# Patient Record
Sex: FEMALE | Race: ASIAN | URBAN - METROPOLITAN AREA
[De-identification: names, ages, dates, MRNs, and addresses within clinical notes are randomized per-mention and may not be internally consistent; named-entity substitution may affect disease eponyms.]

---

## 2018-11-05 ENCOUNTER — HISTORICAL (OUTPATIENT)
Dept: ADMINISTRATIVE | Facility: HOSPITAL | Age: 78
End: 2018-11-05

## 2018-11-05 LAB
HCV AB SERPL QL IA: NEGATIVE
TSH SERPL-ACNC: 2.31 MIU/L (ref 0.36–3.74)

## 2018-11-16 ENCOUNTER — HISTORICAL (OUTPATIENT)
Dept: LAB | Facility: HOSPITAL | Age: 78
End: 2018-11-16

## 2022-04-11 ENCOUNTER — HISTORICAL (OUTPATIENT)
Dept: ADMINISTRATIVE | Facility: HOSPITAL | Age: 82
End: 2022-04-11

## 2022-04-25 VITALS
DIASTOLIC BLOOD PRESSURE: 76 MMHG | HEIGHT: 59 IN | WEIGHT: 106.69 LBS | SYSTOLIC BLOOD PRESSURE: 164 MMHG | BODY MASS INDEX: 21.51 KG/M2 | OXYGEN SATURATION: 100 %

## 2024-03-12 ENCOUNTER — TELEPHONE (OUTPATIENT)
Dept: INTERNAL MEDICINE | Facility: CLINIC | Age: 84
End: 2024-03-12
Payer: MEDICARE

## 2024-03-12 NOTE — TELEPHONE ENCOUNTER
I never worked in [place] and I do not see an upcoming appointment with me. Not sure what this is about.

## 2024-03-12 NOTE — TELEPHONE ENCOUNTER
----- Message from Rhonda Ward sent at 3/11/2024 10:35 AM CDT -----  Contact: Kathya [phone]  Would like to receive medical advice.      Would they like a call back or a response via [facility]:  call back    Additional information:  Kathya is calling to speak to the provider or staff. Pt states she used to see the provider in [place] and would like to become an EP with the provider. Please call the pt back for advice.

## 2024-03-12 NOTE — TELEPHONE ENCOUNTER
----- Message from Amy Cano sent at 3/12/2024  9:31 AM CDT -----  Contact: [phone]  1MEDICALADVICE     Patient is calling for Medical Advice regarding:needs fax number for the drs office     How long has patient had these symptoms:    Pharmacy name and phone#:    Would like response via [employer]:  no     Comments:  She is coming in as a new pt and is needing to have the fax number for the office so she can get the all her records from [place]

## 2024-03-21 ENCOUNTER — OFFICE VISIT (OUTPATIENT)
Dept: INTERNAL MEDICINE | Facility: CLINIC | Age: 84
End: 2024-03-21
Payer: MEDICARE

## 2024-03-21 ENCOUNTER — LAB VISIT (OUTPATIENT)
Dept: LAB | Facility: HOSPITAL | Age: 84
End: 2024-03-21
Payer: MEDICARE

## 2024-03-21 VITALS
BODY MASS INDEX: 19.69 KG/M2 | SYSTOLIC BLOOD PRESSURE: 152 MMHG | WEIGHT: 97.69 LBS | HEIGHT: 59 IN | HEART RATE: 52 BPM | OXYGEN SATURATION: 100 % | DIASTOLIC BLOOD PRESSURE: 81 MMHG | RESPIRATION RATE: 18 BRPM | TEMPERATURE: 98 F

## 2024-03-21 DIAGNOSIS — R79.9 ABNORMAL FINDING OF BLOOD CHEMISTRY, UNSPECIFIED: ICD-10-CM

## 2024-03-21 DIAGNOSIS — Z76.89 ENCOUNTER TO ESTABLISH CARE WITH NEW DOCTOR: ICD-10-CM

## 2024-03-21 DIAGNOSIS — E78.5 HYPERLIPIDEMIA, UNSPECIFIED HYPERLIPIDEMIA TYPE: Primary | ICD-10-CM

## 2024-03-21 DIAGNOSIS — E78.5 HYPERLIPIDEMIA, UNSPECIFIED HYPERLIPIDEMIA TYPE: ICD-10-CM

## 2024-03-21 DIAGNOSIS — I10 ESSENTIAL HYPERTENSION: ICD-10-CM

## 2024-03-21 DIAGNOSIS — M54.16 LUMBAR RADICULOPATHY: ICD-10-CM

## 2024-03-21 DIAGNOSIS — R00.1 ASYMPTOMATIC BRADYCARDIA: ICD-10-CM

## 2024-03-21 DIAGNOSIS — Z12.31 ENCOUNTER FOR SCREENING MAMMOGRAM FOR MALIGNANT NEOPLASM OF BREAST: ICD-10-CM

## 2024-03-21 DIAGNOSIS — M81.0 OSTEOPOROSIS, UNSPECIFIED OSTEOPOROSIS TYPE, UNSPECIFIED PATHOLOGICAL FRACTURE PRESENCE: ICD-10-CM

## 2024-03-21 PROBLEM — I72.9 RUPTURED ANEURYSM OF ARTERY: Status: ACTIVE | Noted: 2022-05-18

## 2024-03-21 PROBLEM — I49.1 PAC (PREMATURE ATRIAL CONTRACTION): Status: ACTIVE | Noted: 2022-02-22

## 2024-03-21 PROBLEM — Z86.79 HISTORY OF CEREBRAL ANEURYSM: Status: ACTIVE | Noted: 2024-03-21

## 2024-03-21 PROBLEM — Z86.79 HISTORY OF CEREBRAL ANEURYSM: Status: RESOLVED | Noted: 2024-03-21 | Resolved: 2024-03-21

## 2024-03-21 PROBLEM — I65.29 CAROTID ARTERY STENOSIS: Status: ACTIVE | Noted: 2024-03-21

## 2024-03-21 PROBLEM — I35.1 MILD AORTIC INSUFFICIENCY: Status: ACTIVE | Noted: 2024-03-21

## 2024-03-21 PROBLEM — I34.0 MILD MITRAL INSUFFICIENCY: Status: ACTIVE | Noted: 2024-03-21

## 2024-03-21 LAB
ESTIMATED AVG GLUCOSE: 105 MG/DL (ref 68–131)
HBA1C MFR BLD: 5.3 % (ref 4–5.6)

## 2024-03-21 PROCEDURE — 36415 COLL VENOUS BLD VENIPUNCTURE: CPT

## 2024-03-21 PROCEDURE — 83036 HEMOGLOBIN GLYCOSYLATED A1C: CPT

## 2024-03-21 PROCEDURE — 99215 OFFICE O/P EST HI 40 MIN: CPT | Mod: PBBFAC

## 2024-03-21 PROCEDURE — 99213 OFFICE O/P EST LOW 20 MIN: CPT | Mod: S$PBB,GC,,

## 2024-03-21 PROCEDURE — 99999 PR PBB SHADOW E&M-EST. PATIENT-LVL V: CPT | Mod: PBBFAC,GC,,

## 2024-03-21 RX ORDER — LOSARTAN POTASSIUM AND HYDROCHLOROTHIAZIDE 12.5; 5 MG/1; MG/1
1 TABLET ORAL DAILY
COMMUNITY
Start: 2023-04-19

## 2024-03-21 RX ORDER — SIMVASTATIN 5 MG/1
5 TABLET, FILM COATED ORAL NIGHTLY
COMMUNITY

## 2024-03-21 RX ORDER — CALCIUM CARBONATE 500(1250)
1 TABLET,CHEWABLE ORAL DAILY
COMMUNITY

## 2024-03-21 RX ORDER — LANOLIN ALCOHOL/MO/W.PET/CERES
500 CREAM (GRAM) TOPICAL
COMMUNITY

## 2024-03-21 RX ORDER — CELECOXIB 200 MG/1
200 CAPSULE ORAL DAILY PRN
Qty: 90 CAPSULE | Refills: 0 | Status: SHIPPED | OUTPATIENT
Start: 2024-03-21 | End: 2024-06-19

## 2024-03-21 RX ORDER — GABAPENTIN 100 MG/1
200 CAPSULE ORAL 3 TIMES DAILY
Qty: 180 CAPSULE | Refills: 11 | Status: SHIPPED | OUTPATIENT
Start: 2024-03-21 | End: 2025-03-21

## 2024-03-21 RX ORDER — FLECAINIDE ACETATE 50 MG/1
50 TABLET ORAL DAILY PRN
COMMUNITY
Start: 2024-01-04 | End: 2024-04-03

## 2024-03-21 RX ORDER — LANOLIN ALCOHOL/MO/W.PET/CERES
1000 CREAM (GRAM) TOPICAL
COMMUNITY

## 2024-03-21 RX ORDER — CELECOXIB 200 MG/1
200 CAPSULE ORAL DAILY PRN
COMMUNITY
End: 2024-03-21 | Stop reason: SDUPTHER

## 2024-03-21 RX ORDER — ACETAMINOPHEN 500 MG
2000 TABLET ORAL DAILY
COMMUNITY

## 2024-03-21 NOTE — PROGRESS NOTES
Subjective     Chief Complaint: Establish Care    History of Present Illness:  Ms. Kathya Romero is a 83 y.o. female with hx of HTN, stroke, HLD, lumbar radiculopathy, osteoporosis who presented to the clinic to establish care after recently moving here from [place]. She is here for a routine follow up. Her main concerns today is chronic low back pain secondary to lumbar radiculopathy. She reports having left sided lower back pain with radiation down her left leg and is associated with tingling. She is currently taking Gabapentin and Celebrex for which helps her pain. She also report chronic intermittent shortness of breath secondary to mitral and aortic valve insufficieny. Her shortness of breath as been stable and was previously followed by Cardiology at [facility]. She has plans to establish care with Cardiology today. Denies any chest pain, current palpitations, abd pain, nausea, vomiting, diarrhea, weakness, numbness, headaches or any vision changes.    HTN: Currently controlled on Losartan-HCTZ  Heart Palpitations: Reports a hx of heart palpitations that she takes Flecainide for. Per chart review at [facility] cardiology, Holter monitor showed Sinus tachycardia but could not rule out Atrial flutter and EKGs have shown PACs.  Hyperlipidemia: Currently on Simvastatin and Niacin  Carotid Artery Stenosis: Reported history of Carotid Artery Stenosis.   Osteoporosis: Currently on Vitamin D3 and Calcium. Previously on ibandronate. DEXA scan from 2021 showed osteoporosis.    Health Maintenance:  Reports that she has completed the series of COVID, Shingles and Pneumoccal vaccine. Would like to get RSV vaccine today. Request to get a mammogram and DEXA scan done.    Review of Systems   Constitutional:  Negative for chills and fever.   HENT:  Negative for congestion.    Eyes:  Negative for blurred vision.   Respiratory:  Positive for shortness of breath. Negative for cough and wheezing.    Cardiovascular:  Positive for  palpitations. Negative for chest pain.   Gastrointestinal:  Negative for abdominal pain, constipation, diarrhea and vomiting.   Musculoskeletal:  Positive for back pain.   Neurological:  Negative for dizziness and headaches.       PAST HISTORY:     Past Medical History:   Diagnosis Date    Cerebral aneurysm     Hemorrhagic stroke     Supraventricular tachycardia        Past Surgical History:   Procedure Laterality Date    CEREBRAL ANEURYSM REPAIR      GALLBLADDER SURGERY  2006    HEMORRHOID SURGERY  1975    HYSTERECTOMY      TONSILLECTOMY         Family History   Problem Relation Age of Onset    Heart disease Mother     Pancreatic cancer Mother     Heart disease Father        Social History     Socioeconomic History    Marital status:    Tobacco Use    Smoking status: Never    Smokeless tobacco: Never   Substance and Sexual Activity    Alcohol use: Never    Drug use: Never       MEDICATIONS & ALLERGIES:     Current Outpatient Medications on File Prior to Visit   Medication Sig    calcium carbonate (OS-TAMMI) 500 mg calcium (1,250 mg) chewable tablet Take 1 tablet by mouth once daily.    cholecalciferol, vitamin D3, (VITAMIN D3) 50 mcg (2,000 unit) Cap capsule Take 2,000 Units by mouth once daily.    cyanocobalamin (VITAMIN B-12) 1000 MCG tablet Take 1,000 mcg by mouth.    flecainide (TAMBOCOR) 50 MG Tab Take 50 mg by mouth daily as needed (palpitations).    losartan-hydrochlorothiazide 50-12.5 mg (HYZAAR) 50-12.5 mg per tablet Take 1 tablet by mouth once daily.    niacin 500 mg TbSR Take 500 mg by mouth.    simvastatin (ZOCOR) 5 MG tablet Take 5 mg by mouth every evening.    [DISCONTINUED] celecoxib (CELEBREX) 200 MG capsule Take 200 mg by mouth daily as needed.     No current facility-administered medications on file prior to visit.       Review of patient's allergies indicates:   Allergen Reactions    Aspirin     Nitrofurantoin Nausea And Vomiting       OBJECTIVE:     Vital Signs:  Vitals:    03/21/24 0820  "03/21/24 0900   BP: (!) 177/71 (!) 152/81   Pulse: (!) 52    Resp: 18    Temp: 98.1 °F (36.7 °C)    SpO2: 100%    Weight: 44.3 kg (97 lb 10.6 oz)    Height: 4' 11" (1.499 m)        Body mass index is 19.73 kg/m².     Physical Exam  Vitals and nursing note reviewed.   Constitutional:       General: She is not in acute distress.     Appearance: Normal appearance.   HENT:      Head: Atraumatic.      Right Ear: External ear normal.      Left Ear: External ear normal.      Mouth/Throat:      Mouth: Mucous membranes are moist.      Pharynx: Oropharynx is clear.   Eyes:      Extraocular Movements: Extraocular movements intact.   Neck:      Vascular: No carotid bruit.   Cardiovascular:      Rate and Rhythm: Bradycardia present. Rhythm irregularly irregular.      Pulses:           Radial pulses are 2+ on the right side and 2+ on the left side.      Heart sounds: Normal heart sounds.   Pulmonary:      Effort: Pulmonary effort is normal.      Breath sounds: Normal breath sounds and air entry.   Abdominal:      General: Abdomen is flat. Bowel sounds are normal.      Palpations: Abdomen is soft.   Musculoskeletal:      Right lower leg: No edema.      Left lower leg: No edema.   Skin:     General: Skin is warm and dry.      Capillary Refill: Capillary refill takes less than 2 seconds.   Neurological:      General: No focal deficit present.      Mental Status: She is alert and oriented to person, place, and time.   Psychiatric:         Mood and Affect: Mood normal.         Behavior: Behavior normal.         Laboratory  No results found for this or any previous visit (from the past 24 hour(s)).    Diagnostic Results:      Health Maintenance Due   Topic Date Due    Lipid Panel  Never done    COVID-19 Vaccine (1) Never done    DEXA Scan  Never done    Shingles Vaccine (1 of 2) Never done    RSV Vaccine (Age 60+ and Pregnant patients) (1 - 1-dose 60+ series) Never done    Pneumococcal Vaccines (Age 65+) (2 of 2 - PPSV23 or PCV20) " 04/01/2017         ASSESSMENT & PLAN:   83 y.o. female with hx of HTN, stroke, HLD, lumbar radiculopathy, osteoporosis who presented to the clinic to establish care after recently moving here from [place]. Discussed with patient about her lumbar radiculopathy which is stable. Refill for Celebrex sent and will increase Gabapentin to 200 mg TID. Will refer patient to [facility]. For shortness of breath, appears to be secondary to aortic and mitral insufficiency. Patient plans to establish care with cardiology for palpitations and valve insufficiency. Will continue Calcium and Vitamin D3 for osteoporosis and will refer for DEXA scan. Low threshold to restart ibandronate pending DEXA results. Will order A1c, CBC, CMP, lipid panel today. Patient agreeable to get RSV vaccine today. Will refer to screening mammogram today. Patient elevated BP in clinic likely secondary to white coat hypertension. Patient's bradycardia likely physiologic in nature as patient's resting heart rate appears to run in the 50s after reviewing Apple Watch data. Will continue to monitor.    1. Hyperlipidemia, unspecified hyperlipidemia type  -     CBC W/ AUTO DIFFERENTIAL; Future; Expected date: 03/21/2024  -     COMPREHENSIVE METABOLIC PANEL; Future; Expected date: 03/21/2024  -     LIPID PANEL; Future; Expected date: 03/21/2024  -     HEMOGLOBIN A1C; Future; Expected date: 03/21/2024    2. Essential hypertension    3. Encounter to establish care with new doctor  -     RSV, Bivalent, RSVPREF (ABRYSVO)  -     Mammo Digital Screening Bilat w/ Bertrand; Future; Expected date: 03/21/2024  -     HEMOGLOBIN A1C; Future; Expected date: 03/21/2024    4. Lumbar radiculopathy  -     Ambulatory referral/consult to [facility]; Future; Expected date: 03/28/2024    5. Osteoporosis, unspecified osteoporosis type, unspecified pathological fracture presence  -     DXA Bone Density Axial Skeleton 1 or more sites; Future; Expected date:  03/21/2024    6. Encounter for screening mammogram for malignant neoplasm of breast  -     Mammo Digital Screening Bilat w/ Bertrand; Future; Expected date: 03/21/2024    7. Abnormal finding of blood chemistry, unspecified  -     HEMOGLOBIN A1C; Future; Expected date: 03/21/2024    8. Asymptomatic bradycardia    Other orders  -     gabapentin (NEURONTIN) 100 MG capsule; Take 2 capsules (200 mg total) by mouth 3 (three) times daily.  Dispense: 180 capsule; Refill: 11  -     celecoxib (CELEBREX) 200 MG capsule; Take 1 capsule (200 mg total) by mouth daily as needed for Pain.  Dispense: 90 capsule; Refill: 0        See above for further detail.    RTC in 6 months    Discussed with Dr Mccoy -- staff attestation to follow      Lorene Beauchamp DO  Internal Medicine PGY-1  [facility]

## 2024-03-22 ENCOUNTER — TELEPHONE (OUTPATIENT)
Dept: INTERNAL MEDICINE | Facility: CLINIC | Age: 84
End: 2024-03-22
Payer: MEDICARE

## 2024-03-22 NOTE — TELEPHONE ENCOUNTER
----- Message from Florecita Gonzalez sent at 3/22/2024  2:32 PM CDT -----  Contact: [phone] patient  Requesting an RX refill or new RX.  Is this a refill or new RX:   RX name and strength Fluticasone Propionate nasal spray 50 mcg  Is this a 30 day or 90 day RX: 90  Pharmacy name and phone # [facility] - [place], [place] - [facility]      The doctors have asked that we provide their patients with the following 2 reminders -- prescription refills can take up to 72 hours, and a friendly reminder that in the future you can use your [facility] account to request refills: yes

## 2024-03-22 NOTE — TELEPHONE ENCOUNTER
----- Message from Florecita Gonzalez sent at 3/22/2024  2:30 PM CDT -----  Contact: Patient [phone]  1MEDICALADVICE     Patient is calling for Medical Advice regarding:Patient want to know if she can take the gabapentin (NEURONTIN) 100 MG capsule for his back pain and leg pain. If so how should she take     How long has patient had these symptoms:    Pharmacy name and phone#:    Would like response via [employer]:  call     Comments:

## 2024-03-22 NOTE — TELEPHONE ENCOUNTER
----- Message from Florecita Gonzalez sent at 3/22/2024  2:30 PM CDT -----  Contact: Patient [phone]  1MEDICALADVICE     Patient is calling for Medical Advice regarding:Patient want to know if she can take the gabapentin (NEURONTIN) 100 MG capsule for his back pain and leg pain. If so how should she take     How long has patient had these symptoms:    Pharmacy name and phone#:    Would like response via [employer]:  call     Comments:

## 2024-03-23 RX ORDER — FLUTICASONE PROPIONATE 50 MCG
1 SPRAY, SUSPENSION (ML) NASAL DAILY
Qty: 18.2 ML | Refills: 0 | Status: SHIPPED | OUTPATIENT
Start: 2024-03-23 | End: 2024-03-26

## 2024-03-25 ENCOUNTER — TELEPHONE (OUTPATIENT)
Dept: INTERNAL MEDICINE | Facility: CLINIC | Age: 84
End: 2024-03-25
Payer: MEDICARE

## 2024-03-25 NOTE — TELEPHONE ENCOUNTER
----- Message from Stuart Viera sent at 3/25/2024  2:13 PM CDT -----  Contact: [phone]  1MEDICALADVICE     Patient is calling for Medical Advice regarding:fluticasone propionate (FLONASE) 50 mcg/actuation nasal spray    How long has patient had these symptoms:    Pharmacy name and phone#:    Would like response via mychart:  call back     Comments:    Pt said she needs a call back about her fluticasone propionate (FLONASE) 50 mcg/actuation nasal spray and gabapentin (NEURONTIN) 100 MG capsule

## 2024-03-26 RX ORDER — FLUTICASONE PROPIONATE 50 MCG
2 SPRAY, SUSPENSION (ML) NASAL DAILY
Qty: 16 G | Refills: 3 | Status: SHIPPED | OUTPATIENT
Start: 2024-03-26 | End: 2024-04-12 | Stop reason: SDUPTHER

## 2024-03-27 NOTE — TELEPHONE ENCOUNTER
Called patient at 1:03 PM to address her questions. Instructed patient to take Gabapentin 200 mg TID PRN for back pain and/or leg pain. Also refilled patient's fluticasone.    Lorene Beauchamp DO  Internal Medicine PGY-1  [facility]

## 2024-03-29 NOTE — PROGRESS NOTES
I have reviewed the notes, assessments, and/or procedures performed by Dr Nito Paulson, I concur with her/his documentation of Kathya Romero.  Date of Service: 3/21/2024

## 2024-03-29 NOTE — PROGRESS NOTES
I have reviewed the notes, assessments, and/or procedures performed by the resident, I concur with her/his documentation of Kathya Romero.  Date of Service: 3/21/2024

## 2024-04-12 NOTE — TELEPHONE ENCOUNTER
----- Message from Andra Yoo sent at 4/11/2024  1:17 PM CDT -----  Contact: Self/[phone]  1MEDICALADVICE     Patient is calling for Medical Advice regarding:medication       Would like response via [employer]: Would like a return call     Comments: pt stated that she is calling in regards to needing to speak with the nurse about a medication that she was prescribed and also she never received a call to schedule her test offered to schedule pt and she stated that the doctor told her someone was going to call her but have not  . Please advise

## 2024-04-17 NOTE — TELEPHONE ENCOUNTER
----- Message from Kayleigh Andujar sent at 4/17/2024  4:25 PM CDT -----  Contact: [phone]  Cc:  Lorene Beauchamp DO  Name of test:    Date of test: 03/21/24    Ordering provider: Lorene Beauchamp DO    Where was the test performed:[facility], [facility]    Comments:

## 2024-04-18 RX ORDER — FLUTICASONE PROPIONATE 50 MCG
2 SPRAY, SUSPENSION (ML) NASAL DAILY
Qty: 16 G | Refills: 3 | Status: SHIPPED | OUTPATIENT
Start: 2024-04-18 | End: 2024-06-17 | Stop reason: SDUPTHER

## 2024-04-18 NOTE — TELEPHONE ENCOUNTER
----- Message from Shabana Harris sent at 4/16/2024  8:44 AM CDT -----  Contact: pt @ [phone]  1MEDICALADVICE     Patient is calling for Medical Advice regarding: pt states she needs the Rx for 90 days and not 30 because of priciing fluticasone propionate (FLONASE) 50 mcg/actuation nasal spray    How long has patient had these symptoms:    Pharmacy name and phone#:  [facility] - [address] [address]  Phone: [phone] Fax: [phone]        Would like response via [employer]:  call     Comments:

## 2024-04-18 NOTE — TELEPHONE ENCOUNTER
----- Message from Nieves Lee sent at 4/17/2024  4:49 PM CDT -----  Contact: [phone]  Pt was seen last week and is coming in Friday to get further testing. Provider told her when she was here at her visit she could have got the RSV vaccine but she did not want to at the time. She would like to know if she can schedule a nurse visit to have it Friday in the morning or if she needs to do it through the pharmacy? Please call pt. Thanks    She states doctor gave her a pink card for her to be able to get it done with his signature on it. She is limited on transportation and wants to be able to do everything in one day while she is here.

## 2024-04-18 NOTE — TELEPHONE ENCOUNTER
----- Message from Nieves Lee sent at 4/17/2024  4:49 PM CDT -----  Contact: [phone]  Pt was seen last week and is coming in Friday to get further testing. Provider told her when she was here at her visit she could have got the RSV vaccine but she did not want to at the time. She would like to know if she can schedule a nurse visit to have it Friday in the morning or if she needs to do it through the pharmacy? Please call pt. Thanks    She states doctor gave her a pink card for her to be able to get it done with his signature on it. She is limited on transportation and wants to be able to do everything in one day while she is here.

## 2024-04-19 ENCOUNTER — HOSPITAL ENCOUNTER (OUTPATIENT)
Dept: RADIOLOGY | Facility: HOSPITAL | Age: 84
Discharge: HOME | End: 2024-04-19
Payer: MEDICARE

## 2024-04-19 ENCOUNTER — HOSPITAL ENCOUNTER (OUTPATIENT)
Dept: RADIOLOGY | Facility: CLINIC | Age: 84
Discharge: HOME | End: 2024-04-19
Payer: MEDICARE

## 2024-04-19 VITALS — WEIGHT: 96 LBS | BODY MASS INDEX: 19.39 KG/M2

## 2024-04-19 DIAGNOSIS — Z12.31 ENCOUNTER FOR SCREENING MAMMOGRAM FOR MALIGNANT NEOPLASM OF BREAST: ICD-10-CM

## 2024-04-19 DIAGNOSIS — M81.0 OSTEOPOROSIS, UNSPECIFIED OSTEOPOROSIS TYPE, UNSPECIFIED PATHOLOGICAL FRACTURE PRESENCE: ICD-10-CM

## 2024-04-19 DIAGNOSIS — Z76.89 ENCOUNTER TO ESTABLISH CARE WITH NEW DOCTOR: ICD-10-CM

## 2024-04-19 PROCEDURE — 77080 DXA BONE DENSITY AXIAL: CPT | Mod: 26,,, | Performed by: INTERNAL MEDICINE

## 2024-04-19 PROCEDURE — 77067 SCR MAMMO BI INCL CAD: CPT | Mod: 26,,, | Performed by: RADIOLOGY

## 2024-04-19 PROCEDURE — 77080 DXA BONE DENSITY AXIAL: CPT | Mod: TC

## 2024-04-19 PROCEDURE — 77067 SCR MAMMO BI INCL CAD: CPT | Mod: TC

## 2024-04-19 PROCEDURE — 77063 BREAST TOMOSYNTHESIS BI: CPT | Mod: 26,,, | Performed by: RADIOLOGY

## 2024-04-23 RX ORDER — FLUTICASONE PROPIONATE 50 MCG
2 SPRAY, SUSPENSION (ML) NASAL DAILY
Qty: 16 G | Refills: 3 | Status: CANCELLED | OUTPATIENT
Start: 2024-04-23 | End: 2024-07-22

## 2024-04-23 NOTE — TELEPHONE ENCOUNTER
----- Message from Dulce Meredith sent at 4/23/2024  4:14 PM CDT -----  Contact: [phone]  Pt is requesting a callback in regards to her prescription for fluticasone propionate (FLONASE) 50 mcg/actuation nasal spray 16 g.    Pt states she has called several times in regards to getting a 90 day supply instead of 30 day supply. Per pt , the 30 day supply is costing her more.     Pt would like a callback on today to speak with someone in regards to this matter.     Pt is using    [facility] - [address] [address]  Phone: [phone] Fax: [phone]        Pt also has a few questions about a few other concerns she has. Pt is requesting to speak with Dr Beauchamp directly.           Thank you

## 2024-04-24 NOTE — TELEPHONE ENCOUNTER
----- Message from Josefina Black sent at 4/19/2024  1:44 PM CDT -----  Contact: [phone]@patient  Good afternoon patient would like to request that her med fluticasone propionate (FLONASE) 50 mcg/actuation nasal spray be called in for a 90 day not 30 day.Patient also needs help with finding a PT near her. Please call patient to advise  [phone]

## 2024-04-25 DIAGNOSIS — M54.16 LUMBAR RADICULOPATHY: Primary | ICD-10-CM

## 2024-04-25 RX ORDER — FLUTICASONE PROPIONATE 50 MCG
2 SPRAY, SUSPENSION (ML) NASAL DAILY
Qty: 16 G | Refills: 3 | OUTPATIENT
Start: 2024-04-25 | End: 2025-04-20

## 2024-04-26 ENCOUNTER — TELEPHONE (OUTPATIENT)
Dept: INTERNAL MEDICINE | Facility: CLINIC | Age: 84
End: 2024-04-26
Payer: MEDICARE

## 2024-04-26 DIAGNOSIS — M81.0 OSTEOPOROSIS WITHOUT CURRENT PATHOLOGICAL FRACTURE, UNSPECIFIED OSTEOPOROSIS TYPE: Primary | ICD-10-CM

## 2024-04-26 NOTE — TELEPHONE ENCOUNTER
Called patient to discuss DEXA scan results. Discussed with patient about the need to start anabolic agents. Referral sent to Endocrinology.     Lorene Beauchamp DO  Internal Medicine PGY-1  [facility]

## 2024-04-29 ENCOUNTER — TELEPHONE (OUTPATIENT)
Dept: INTERNAL MEDICINE | Facility: CLINIC | Age: 84
End: 2024-04-29
Payer: MEDICARE

## 2024-04-29 NOTE — TELEPHONE ENCOUNTER
Please advise , only one doctor   She wants you to tell what one to chose and also medications  Please see former message and advise

## 2024-04-29 NOTE — TELEPHONE ENCOUNTER
----- Message from Andra Yoo sent at 4/29/2024  8:25 AM CDT -----  Contact: Self/ [phone]  1MEDICALADVICE     Patient is calling for Medical Advice regarding:Check the status of a referral /medication     How long has patient had these symptoms:Zeynep    Pharmacy name and phone#:[facility]    Would like response via [employer]:      Comments: pt said that she Is calling in regards to Dr Beauchamp was supposed to be referring her to a doctor for Arthritis but she has not received any information( Pt stated that she only wants the name of one doctor) also patient wants to know if she should start taking Calcium and Magnesium or wait until she sees the Specialist  . Please advise

## 2024-04-30 NOTE — TELEPHONE ENCOUNTER
----- Message from Kayleigh Andujar sent at 2024  4:26 PM CDT -----  Regardinnd message  Contact: [phone]  Cc:  Lorene Beauchamp, DO  Patient called, in regards following up on what DO Nito told patient about the back specialist for patient back. Patient want to check on that referral. Please call and advise. Thank you.

## 2024-05-02 ENCOUNTER — TELEPHONE (OUTPATIENT)
Dept: INTERNAL MEDICINE | Facility: CLINIC | Age: 84
End: 2024-05-02
Payer: MEDICARE

## 2024-05-02 NOTE — TELEPHONE ENCOUNTER
----- Message from Mason Holder sent at 5/2/2024 10:02 AM CDT -----  Regarding: ANTONIO CHANEL  Contact: Kathya [phone]  ANTONIO CHANEL Patient    Patient would like to get a referral.  Referral to what specialty: Back and spine doctor  Does the patient want the referral with a specific physician:    Is the specialist [facility]:    Reason (be specific):  Pain in back  Does the patient already have the specialty clinic appointment scheduled:  no  If yes, what date is the appointment scheduled:     Is the insurance listed in Epic correct? (this is important for a referral):  yes  Advised patient that once provider approves this either a nurse or  will return their call?: Nurse  Would the patient like a call back, or a response through their [facility] portal?: Callback or portal    Comments:    Call back asap

## 2024-05-02 NOTE — TELEPHONE ENCOUNTER
----- Message from Mason Holder sent at 5/2/2024 10:02 AM CDT -----  Regarding: ANTONIO CHANEL  Contact: Kathya [phone]  ANTONIO CHANEL Patient    Patient would like to get a referral.  Referral to what specialty: Back and spine doctor  Does the patient want the referral with a specific physician:    Is the specialist [facility]:    Reason (be specific):  Pain in back  Does the patient already have the specialty clinic appointment scheduled:  no  If yes, what date is the appointment scheduled:     Is the insurance listed in Epic correct? (this is important for a referral):  yes  Advised patient that once provider approves this either a nurse or  will return their call?: Nurse  Would the patient like a call back, or a response through their [facility] portal?: Callback or portal    Comments:    Call back asap

## 2024-05-07 ENCOUNTER — TELEPHONE (OUTPATIENT)
Dept: INTERNAL MEDICINE | Facility: CLINIC | Age: 84
End: 2024-05-07
Payer: MEDICARE

## 2024-05-07 NOTE — TELEPHONE ENCOUNTER
----- Message from Leonila Pimentel sent at 5/7/2024  1:42 PM CDT -----  Contact: Pt  [phone]  Would like to receive medical advice.  Would they like a call back or a response via [facility]:  Call Back   Additional information:      Pt is calling to see if she can be referred for any other providers for endocrinology so she can be seen sooner. She says that Dr. Burgess has appts far out and even with the wait list theres a chance she won't be seen soon. She also would like to speak about a medication she has questions about.

## 2024-05-14 ENCOUNTER — TELEPHONE (OUTPATIENT)
Dept: INTERNAL MEDICINE | Facility: CLINIC | Age: 84
End: 2024-05-14
Payer: MEDICARE

## 2024-05-14 NOTE — TELEPHONE ENCOUNTER
----- Message from Jennifer S Tre sent at 5/8/2024  1:46 PM CDT -----  Contact: Mobile  [phone]  Patient would like to speak with you about a Specialist that you have recommended for her to see for her Arthritis.     Patient would like to know why do she have to take niacin 500 mg TbSR? Patient would also like to speak with you about her waning to get a medication for her Arthritis.

## 2024-05-21 ENCOUNTER — TELEPHONE (OUTPATIENT)
Dept: INTERNAL MEDICINE | Facility: CLINIC | Age: 84
End: 2024-05-21
Payer: MEDICARE

## 2024-05-21 DIAGNOSIS — M81.0 OSTEOPOROSIS WITHOUT CURRENT PATHOLOGICAL FRACTURE, UNSPECIFIED OSTEOPOROSIS TYPE: Primary | ICD-10-CM

## 2024-05-21 NOTE — TELEPHONE ENCOUNTER
Called patient x3 to discussed mammogram results and her requests to no avail. Left voice mail.     Patient called back. Discussed results about mammogram and sent new referral to an endocrinologist.

## 2024-05-21 NOTE — TELEPHONE ENCOUNTER
----- Message from Earline Meredith sent at 5/20/2024 12:30 PM CDT -----  Contact: Self [phone]  Would like to receive medical advice.    Would they like a call back or a response via [facility]:  call back    Additional information:  Pt is requesting a referral to a rheumatologist for  arthritis and mammogram results. Pt states she have called several times with no answer within a week.

## 2024-06-06 ENCOUNTER — TELEPHONE (OUTPATIENT)
Dept: INTERNAL MEDICINE | Facility: CLINIC | Age: 84
End: 2024-06-06
Payer: MEDICARE

## 2024-06-06 NOTE — TELEPHONE ENCOUNTER
Spoke w/ pt and notified her that Dr. Beauchamp stated that she could see anyone at [facility] or [facility]. She then said that there were no appointments available. I then told her to call the number on her insurance card to get assistance with available endocrine providers that takes her insurance. I also stated that once she finds a provider that does have available appt, she can call us and we will fax over the referral. Pt understood.

## 2024-06-17 NOTE — TELEPHONE ENCOUNTER
----- Message from Andra Yoo sent at 6/17/2024 11:24 AM CDT -----  Contact: Self/[phone]  Requesting an RX refill or new RX.    Is this a refill or new RX: New    RX name and strength : fluticasone propionate (FLONASE) 50 mcg/actuation nasal spray    Is this a 30 day or 90 day RX: 90    Pharmacy name and phone # :  [facility] - [address] [address]  Phone: [phone] Fax: [phone]         The doctors have asked that we provide their patients with the following 2 reminders -- prescription refills can take up to 72 hours, and a friendly reminder that in the future you can use your [facility] account to request refills: pt stated that she needs a 90 day supply for this medication

## 2024-06-18 RX ORDER — FLUTICASONE PROPIONATE 50 MCG
2 SPRAY, SUSPENSION (ML) NASAL DAILY
Qty: 16 G | Refills: 3 | Status: SHIPPED | OUTPATIENT
Start: 2024-06-18 | End: 2024-09-16

## 2024-06-18 NOTE — TELEPHONE ENCOUNTER
----- Message from Andra Yoo sent at 6/18/2024  1:00 PM CDT -----  Contact: Self/ [phone]  1MEDICALADVICE     Patient is calling for Medical Advice regarding:medication     How long has patient had these symptoms:NA    Pharmacy name and phone#: [facility] - [place], [place] - [address] [place] [address]  Phone: [phone] Fax: [phone]       Patient wants a call back or thru [facility]:call back     Comments: pt said that she is calling in regards to needing to speak with the doctor only , pt stated that she was told by the nurse that the doctor was sending a rx for fluticasone propionate (FLONASE) 50 mcg/actuation nasal spray to the pharmacy but they have not received the rx.    Please advise patient replies from provider may take up to 48 hours.

## 2024-06-25 RX ORDER — FLUTICASONE PROPIONATE 50 MCG
2 SPRAY, SUSPENSION (ML) NASAL DAILY
Qty: 16 G | Refills: 3 | Status: CANCELLED | OUTPATIENT
Start: 2024-06-25 | End: 2024-09-23

## 2024-06-25 NOTE — TELEPHONE ENCOUNTER
----- Message from Aldo Gamble sent at 6/25/2024  2:25 PM CDT -----  Contact: Pt  [phone]  Requesting an RX refill or new RX.    Is this a refill or new RX: Refill    RX name and strength (copy/paste from chart):  fluticasone propionate (FLONASE) 50 mcg/actuation nasal spray    Is this a 30 day or 90 day RX: 90 days 90    Pharmacy name and phone # (copy/paste from chart):  [facility] - [address]   Phone: [phone]  Fax: [phone]      The doctors have asked that we provide their patients with the following 2 reminders -- prescription refills can take up to 72 hours, and a friendly reminder that in the future you can use your [facility] account to request refills: yes    Pt states she always gets this medication for 90 days but only 4 time a year she states that will be less expensive for her

## 2024-06-27 ENCOUNTER — TELEPHONE (OUTPATIENT)
Dept: ENDOCRINOLOGY | Facility: CLINIC | Age: 84
End: 2024-06-27
Payer: MEDICARE

## 2024-06-27 RX ORDER — FLUTICASONE PROPIONATE 50 MCG
2 SPRAY, SUSPENSION (ML) NASAL DAILY
Qty: 16 G | Refills: 12 | Status: SHIPPED | OUTPATIENT
Start: 2024-06-27 | End: 2024-07-27

## 2024-06-27 NOTE — TELEPHONE ENCOUNTER
----- Message from Parrish Fenton sent at 6/27/2024 11:08 AM CDT -----  Type:  Appointment Request     Name of Caller:Pt  When is the first available appointment?No access  Symptoms:referral for bone density  Would the patient rather a call back or a response via [facility]? Call back  Best Call Back Number:[phone]   Additional Information: Patient has a referral that needs to be scheduled. Please reach out as soon as possible with further.

## 2024-06-27 NOTE — TELEPHONE ENCOUNTER
----- Message from Jeanne Pennybernadette Vera sent at 6/27/2024  8:14 AM CDT -----  Contact: [phone]  1MEDICALADVICE     Patient is calling for Medical Advice regarding:pt is calling to needs to speak with you about her prescriptions. Pt wants her meds every month not every 90 days.  Please call her back she has questions and issues.    How long has patient had these symptoms:    Pharmacy name and phone#:    Patient wants a call back or thru [facility]:callback    Comments:    Please advise patient replies from provider may take up to 48 hours.

## 2024-06-28 ENCOUNTER — TELEPHONE (OUTPATIENT)
Dept: INTERNAL MEDICINE | Facility: CLINIC | Age: 84
End: 2024-06-28
Payer: MEDICARE

## 2024-06-28 NOTE — TELEPHONE ENCOUNTER
----- Message from Josefina Black sent at 6/28/2024  8:56 AM CDT -----  Contact: [phone]@patient  Good morning patient would like a call back from the doc only to discuss her med refill. Patient says she needs 90 days refill and have only been given 30 day and she wants to speak to the doc only about this matter. Please call patient to advise [phone]

## 2024-06-28 NOTE — TELEPHONE ENCOUNTER
----- Message from Josefina Black sent at 6/28/2024  8:56 AM CDT -----  Contact: [phone]@patient  Good morning patient would like a call back from the doc only to discuss her med refill. Patient says she needs 90 days refill and have only been given 30 day and she wants to speak to the doc only about this matter. Please call patient to advise [phone]

## 2024-07-01 ENCOUNTER — TELEPHONE (OUTPATIENT)
Dept: ENDOCRINOLOGY | Facility: CLINIC | Age: 84
End: 2024-07-01
Payer: MEDICARE

## 2024-07-01 NOTE — TELEPHONE ENCOUNTER
----- Message from Li Vera sent at 7/1/2024  3:36 PM CDT -----  Regarding: Appt  Contact: pt @ [phone]  Pt is calling to get appt for M81.0 (ICD-10-CM) - Osteoporosis without current pathological fracture, unspecified osteoporosis type. Asking for a call back

## 2024-07-01 NOTE — TELEPHONE ENCOUNTER
Called pt and offered her an appt on October 7th at 2:30 with dr prince. Pt accepted the appt but wanted to get on the wait list for an appt in the end of  August. Pt stated she has been waiting over 2 months for an appt to get scheduled I informed her I put her on the wait list and scheduled the appt. She was okay with that.

## 2024-07-02 ENCOUNTER — TELEPHONE (OUTPATIENT)
Dept: INTERNAL MEDICINE | Facility: CLINIC | Age: 84
End: 2024-07-02
Payer: MEDICARE

## 2024-07-02 NOTE — TELEPHONE ENCOUNTER
She  spoke the pharmacy    Wants a you to pre Auth   To get her Flonase for her  for 3 mons instead of 1 month   Because it is cheaper for   The insurance at this time will only cover 1 month at a time  Please call [phone]  Thank you

## 2024-07-02 NOTE — TELEPHONE ENCOUNTER
----- Message from Angela Dumont sent at 7/2/2024  8:05 AM CDT -----  Contact: [phone] PAtient  Message is for Dr Lorene Beauchamp      Pt is calling in regards to the previous message sent. Pt is asking if the doctor can please call her back she has some questions. Pt gave no other information. Please call and advise. Thank you.

## 2024-07-05 NOTE — TELEPHONE ENCOUNTER
----- Message from Jeanne Shieldstte Chuy sent at 7/5/2024 11:53 AM CDT -----  Contact: [phone]  1MEDICALADVICE     Patient is calling for Medical Advice regarding:pt is calling to speak with the dr but not the nurse.  The fluticasone propionate (FLONASE) 50 mcg/actuation nasal spray 16 g  she needs this for 90 days supplies not 30 it cost more $$ to receive this as 30 days.  Please send this for 90 days only and call pt back. the quantify of 3 for 90 days    How long has patient had these symptoms:    Pharmacy name and phone#:    Patient wants a call back or thru [facility]:callback    Comments:    Please advise patient replies from provider may take up to 48 hours.

## 2024-07-07 RX ORDER — FLUTICASONE PROPIONATE 50 MCG
2 SPRAY, SUSPENSION (ML) NASAL DAILY
Qty: 48 G | Refills: 3 | Status: SHIPPED | OUTPATIENT
Start: 2024-07-07 | End: 2024-10-05

## 2024-07-13 ENCOUNTER — TELEPHONE (OUTPATIENT)
Dept: INTERNAL MEDICINE | Facility: CLINIC | Age: 84
End: 2024-07-13
Payer: MEDICARE

## 2024-07-13 NOTE — TELEPHONE ENCOUNTER
----- Message from Kayleightessy Andujar sent at 7/11/2024 12:48 PM CDT -----  Contact: [phone]  Cc: Lorene Beauchamp DO  Patient called, requested a courtesy call from nurse in regards been concern about the way that Rx has been refill, patient believe that she is going to ran out of Rx Flonase. Patient will be available until 1:30 pm, after that she will be available until 5 pm.  Please call and advise. Thank you.

## 2024-07-23 NOTE — TELEPHONE ENCOUNTER
Called patient who reports that she got the correct prescription for Flonase.  Will have her follow up on 9/3/24 at 09:45.    Lorene Beauchamp,   Internal Medicine PGY-2  [facility]

## 2024-09-18 ENCOUNTER — TELEPHONE (OUTPATIENT)
Dept: INTERNAL MEDICINE | Facility: CLINIC | Age: 84
End: 2024-09-18
Payer: MEDICARE

## 2024-09-18 NOTE — TELEPHONE ENCOUNTER
Left voice message   Unable to change appointment with dr Nito Beauchamp is not in clinic on day requested

## 2024-09-18 NOTE — TELEPHONE ENCOUNTER
----- Message from Jeanne Vera sent at 9/18/2024 11:53 AM CDT -----  Contact: [phone]  Caller is requesting an earlier appointment then we can schedule.  Caller is requesting a message be sent to the provider.    If this is for urgent care symptoms, did you offer other providers at this location, providers at other locations, or [facility]? (yes, no, n/a):  N/a    If this is for the patients physical, did you offer to schedule next available and put on wait list, or to see NP or PA for their physical?  (yes, no, n/a):  Yes/she has another appt on 10/7/24 for 2:30pm she would like to have it on the same day for 1pm instead of 10/15/24 .    When is the next available appointment with their provider:  10/17/24    Reason for the appointment:  f/u    Patient preference of timeframe to be scheduled:  Yes/she has another appt on 10/7/24 for 2:30pm she would like to have it on the same day.      Would the patient like a call back, or a response through their [facility] portal?:  callback    Comments:

## 2024-09-30 ENCOUNTER — TELEPHONE (OUTPATIENT)
Dept: INTERNAL MEDICINE | Facility: CLINIC | Age: 84
End: 2024-09-30
Payer: MEDICARE

## 2024-09-30 NOTE — TELEPHONE ENCOUNTER
----- Message from Med Assistant Sierra sent at 9/30/2024  8:50 AM CDT -----  Contact: edward@ [phone]  Pt called  .                Pt is requesting a call back to get upcoming appt 10/15 to be rescheduled to this week if possible.

## 2024-10-07 ENCOUNTER — LAB VISIT (OUTPATIENT)
Dept: LAB | Facility: HOSPITAL | Age: 84
End: 2024-10-07
Attending: INTERNAL MEDICINE
Payer: MEDICARE

## 2024-10-07 ENCOUNTER — TELEPHONE (OUTPATIENT)
Dept: HEPATOLOGY | Facility: HOSPITAL | Age: 84
End: 2024-10-07
Payer: MEDICARE

## 2024-10-07 ENCOUNTER — OFFICE VISIT (OUTPATIENT)
Dept: ENDOCRINOLOGY | Facility: CLINIC | Age: 84
End: 2024-10-07
Payer: MEDICARE

## 2024-10-07 ENCOUNTER — TELEPHONE (OUTPATIENT)
Dept: INTERNAL MEDICINE | Facility: CLINIC | Age: 84
End: 2024-10-07
Payer: MEDICARE

## 2024-10-07 VITALS
WEIGHT: 95.38 LBS | BODY MASS INDEX: 19.23 KG/M2 | HEIGHT: 59 IN | DIASTOLIC BLOOD PRESSURE: 72 MMHG | SYSTOLIC BLOOD PRESSURE: 110 MMHG

## 2024-10-07 DIAGNOSIS — M81.0 OSTEOPOROSIS, UNSPECIFIED OSTEOPOROSIS TYPE, UNSPECIFIED PATHOLOGICAL FRACTURE PRESENCE: Primary | ICD-10-CM

## 2024-10-07 DIAGNOSIS — M81.8 OTHER OSTEOPOROSIS WITHOUT CURRENT PATHOLOGICAL FRACTURE: ICD-10-CM

## 2024-10-07 DIAGNOSIS — M81.0 OSTEOPOROSIS WITHOUT CURRENT PATHOLOGICAL FRACTURE, UNSPECIFIED OSTEOPOROSIS TYPE: ICD-10-CM

## 2024-10-07 DIAGNOSIS — M81.0 OSTEOPOROSIS, UNSPECIFIED OSTEOPOROSIS TYPE, UNSPECIFIED PATHOLOGICAL FRACTURE PRESENCE: ICD-10-CM

## 2024-10-07 LAB
25(OH)D3+25(OH)D2 SERPL-MCNC: 47 NG/ML (ref 30–96)
ALBUMIN SERPL BCP-MCNC: 4 G/DL (ref 3.5–5.2)
ALP SERPL-CCNC: 111 U/L (ref 55–135)
ANION GAP SERPL CALC-SCNC: 9 MMOL/L (ref 8–16)
BUN SERPL-MCNC: 14 MG/DL (ref 8–23)
CALCIUM SERPL-MCNC: 9.8 MG/DL (ref 8.7–10.5)
CHLORIDE SERPL-SCNC: 103 MMOL/L (ref 95–110)
CO2 SERPL-SCNC: 24 MMOL/L (ref 23–29)
CREAT SERPL-MCNC: 0.8 MG/DL (ref 0.5–1.4)
EST. GFR  (NO RACE VARIABLE): >60 ML/MIN/1.73 M^2
GLUCOSE SERPL-MCNC: 72 MG/DL (ref 70–110)
PHOSPHATE SERPL-MCNC: 2.7 MG/DL (ref 2.7–4.5)
POTASSIUM SERPL-SCNC: 3.7 MMOL/L (ref 3.5–5.1)
PTH-INTACT SERPL-MCNC: 47.1 PG/ML (ref 9–77)
SODIUM SERPL-SCNC: 136 MMOL/L (ref 136–145)
TSH SERPL DL<=0.005 MIU/L-ACNC: 1.38 UIU/ML (ref 0.4–4)

## 2024-10-07 PROCEDURE — G2211 COMPLEX E/M VISIT ADD ON: HCPCS | Mod: S$PBB,,, | Performed by: INTERNAL MEDICINE

## 2024-10-07 PROCEDURE — 99999 PR PBB SHADOW E&M-EST. PATIENT-LVL III: CPT | Mod: PBBFAC,,, | Performed by: INTERNAL MEDICINE

## 2024-10-07 PROCEDURE — 80069 RENAL FUNCTION PANEL: CPT | Performed by: INTERNAL MEDICINE

## 2024-10-07 PROCEDURE — 82306 VITAMIN D 25 HYDROXY: CPT | Performed by: INTERNAL MEDICINE

## 2024-10-07 PROCEDURE — 99213 OFFICE O/P EST LOW 20 MIN: CPT | Mod: PBBFAC | Performed by: INTERNAL MEDICINE

## 2024-10-07 PROCEDURE — 36415 COLL VENOUS BLD VENIPUNCTURE: CPT | Performed by: INTERNAL MEDICINE

## 2024-10-07 PROCEDURE — 83970 ASSAY OF PARATHORMONE: CPT | Performed by: INTERNAL MEDICINE

## 2024-10-07 PROCEDURE — 99204 OFFICE O/P NEW MOD 45 MIN: CPT | Mod: S$PBB,,, | Performed by: INTERNAL MEDICINE

## 2024-10-07 PROCEDURE — 84075 ASSAY ALKALINE PHOSPHATASE: CPT | Performed by: INTERNAL MEDICINE

## 2024-10-07 PROCEDURE — 84443 ASSAY THYROID STIM HORMONE: CPT | Performed by: INTERNAL MEDICINE

## 2024-10-07 NOTE — TELEPHONE ENCOUNTER
----- Message from Andra sent at 10/4/2024  2:46 PM CDT -----  Contact: Self/ [phone]  1MEDICALADVICE     Patient is calling for Medical Advice regarding:status check on messages     How long has patient had these symptoms:    Patient wants a call back or thru [facility]: call back     Comments:pt said that she is calling in regards to she has been calling for a couple of weeks asking is there is something she can take for the stomach issues that she has been having and no one has returned her call pt stated that she would like for Dr Beauchamp to return her call . Please advise     Please advise patient replies from provider may take up to 48 hours.

## 2024-10-07 NOTE — TELEPHONE ENCOUNTER
Constipation  Stomach pain , always hard   Tried over the counter medication ,   Drinking fluids , and doing frits and vegetables  Can also put in a referral for cardiologist

## 2024-10-07 NOTE — PROGRESS NOTES
Subjective:      Patient ID: Snehlata D Nick is a 83 y.o. female.    Chief Complaint:  Osteoporosis      History of Present Illness  Ms. Romero presents for evaluation and management of osteoporosis.     Has active history of HTN, HLP and osteoporosis.     First diagnosed with osteoporosis in via outside BMD in 2014. However, outside imaging report from 2011 notes chronic mild superior endplate compression fractures from T2 to T4 which appeared to be subacute or chronic at the time.    Current osteoporosis regimen:  Takes OTC calcium 500 mg and vitamin D3 2000 units once daily     Osteoporosis medication history:  None    Risk factors for osteoporosis  -Personal history of fracture: denies fracture but had remote mild compression fractures noted on imgaing from 2011  -Family history of hip fracture or osteoporosis: denies   -Premature/Early menopause: mid forties, TAHBSO, was on HRT for a few years after   -Chronic steroid therapy: none  -History of rheumatoid arthritis: none  -Smoking History: none  -Current EtOH use: none  -Fall Risk: good balance, but occasionally has accidental fall    Risk for Secondary Osteoporosis:  -TSH: last normal in 2018  -Calcium: corrected calcium high normal in 4/2024  -Vitamin D: no vitamin D level on file  -Anemia and renal insufficiency: none  -Signs/symptoms of malabsorption:  none  -PPI use: rarely as needed  -Diabetes: none  -Chronic liver disease: none    Height loss:  1-2 inches     Exercise:  Walks about 2-3 miles everyday    Pending dental work:  None    Last Bone Density Scan dated 4/2024:  COMPARISON:  None     FINDINGS:  Lumbar spine (L1-L4):               T-score is -3.5, and Z-score is -0.7.     Femoral neck:                          T-score is -2.7, and Z-score is -0.3.     Total hip:                                T-score is -2.0, and Z-score is 0.2.     Distal 1/3 radius:                      Not applicable        Fracture Risk (FRAX)     16% risk of a major  osteoporotic fracture in the next 10 years.     6.2% risk of hip fracture in the next 10 years.     Impression:     *Osteoporosis based on T-score below -2.5  *Fracture risk is very high due to calculated 10 year risk of hip fracture >4.5% (FRAX).      Denies history of kidney stones. No hx of radiation therapy.     Review of Systems   Constitutional:  Negative for chills and fever.   Gastrointestinal:  Negative for nausea.       Objective:   Physical Exam  Vitals and nursing note reviewed.       BP Readings from Last 3 Encounters:   10/07/24 110/72   09/05/24 106/60   03/21/24 (!) 152/81     Wt Readings from Last 1 Encounters:   10/07/24 1432 43.2 kg (95 lb 5.6 oz)       Body mass index is 19.26 kg/m².    Lab Review:   Lab Results   Component Value Date    HGBA1C 5.3 03/21/2024     Lab Results   Component Value Date    CHOL 168 04/19/2024    HDL 73 04/19/2024    LDLCALC 84.2 04/19/2024    TRIG 54 04/19/2024    CHOLHDL 43.5 04/19/2024     Lab Results   Component Value Date     10/07/2024    K 3.7 10/07/2024     10/07/2024    CO2 24 10/07/2024    GLU 72 10/07/2024    BUN 14 10/07/2024    CREATININE 0.8 10/07/2024    CALCIUM 9.8 10/07/2024    PROT 6.6 04/19/2024    ALBUMIN 4.0 10/07/2024    BILITOT 0.6 04/19/2024    ALKPHOS 111 10/07/2024    AST 22 04/19/2024    ALT 26 04/19/2024    ANIONGAP 9 10/07/2024    TSH 1.378 10/07/2024         Assessment and Plan     Osteoporosis  --Patient presenting for evaluation and management of osteoporosis  --Risk factors include age and post menopause  --Has history of fragility fracture with spontaneous T2-T4 endplate compression fractures  --Patient considered to have severe osteoporosis due to fracture history, and at very high risk for fracture given FRAX >4.5% for hip fracture  --Will complete secondary workup with vitamin D, renal panel, PTH, TSH and alk phos  --Reviewed options if secondary workup unrevealing  --Best option given the above is an anabolic  agent  --After discussion she would be a good candidate for once monthly Evenity  --If Evenity approved she will take it for one year, then this will be followed with either Reclast or Prolia  --Reviewed common and rare side effects of anabolic agents and antiresorptives including ONJ and atypical femoral fractures  --She agrees to medical thearpy  --Continue calcium and vitamin D supplementation  --Continue weight bearing exercise at tolerated  --Cautioned on falls avoidance      Follow up in one year      Visit today included increased complexity associated with the care of the episodic problem osteoporosis addressed and managing the longitudinal care of the patient due to the serious and/or complex managed problem(s).      Néstor Mckeon M.D. Staff Endocrinology

## 2024-10-07 NOTE — TELEPHONE ENCOUNTER
Called patient on behalf of dr. Beauchamp.     Patient reports constipation. Not taking bowel reg daily. Encouraged patient to take bowel reg bid, hydrate, and present to clinic for in person eval. Patient unsure if she wants to come to clinic because she is tired. Stressed importance of in person eval.     Staff to call back and schedule appt

## 2024-10-07 NOTE — TELEPHONE ENCOUNTER
----- Message from Andra sent at 10/4/2024  2:46 PM CDT -----  Contact: Self/ [phone]  1MEDICALADVICE     Patient is calling for Medical Advice regarding:status check on messages     How long has patient had these symptoms:    Patient wants a call back or thru [facility]: call back     Comments:pt said that she is calling in regards to she has been calling for a couple of weeks asking is there is something she can take for the stomach issues that she has been having and no one has returned her call pt stated that she would like for Dr Beauchamp to return her call . Please advise     Please advise patient replies from provider may take up to 48 hours.

## 2024-10-07 NOTE — TELEPHONE ENCOUNTER
----- Message from Andra sent at 10/4/2024  2:46 PM CDT -----  Contact: Self/ [phone]  1MEDICALADVICE     Patient is calling for Medical Advice regarding:status check on messages     How long has patient had these symptoms:    Patient wants a call back or thru [facility]: call back     Comments:pt said that she is calling in regards to she has been calling for a couple of weeks asking is there is something she can take for the stomach issues that she has been having and no one has returned her call pt stated that she would like for Dr Beauchamp to return her call . Please advise     Please advise patient replies from provider may take up to 48 hours.

## 2024-10-07 NOTE — TELEPHONE ENCOUNTER
----- Message from Andra sent at 10/4/2024  2:46 PM CDT -----  Contact: Self/ [phone]  1MEDICALADVICE     Patient is calling for Medical Advice regarding:status check on messages     How long has patient had these symptoms:    Patient wants a call back or thru [facility]: call back     Comments:pt said that she is calling in regards to she has been calling for a couple of weeks asking is there is something she can take for the stomach issues that she has been having and no one has returned her call pt stated that she would like for Dr Beauchamp to return her call . Please advise     Please advise patient replies from provider may take up to 48 hours.

## 2024-10-08 ENCOUNTER — TELEPHONE (OUTPATIENT)
Dept: ENDOCRINOLOGY | Facility: CLINIC | Age: 84
End: 2024-10-08
Payer: MEDICARE

## 2024-10-08 NOTE — TELEPHONE ENCOUNTER
Left a message on daughter VM for mother to give us a call back. Number on file is wrong.       Per Mike    Please advise patient that I reviewed her lab results. All of her labs came back normal including her vitamin D level. She should continue her current supplementation doses for vitamin D and calcium. I'm going to go ahead and order the once monthly Evenity injections like we discussed to see if covered by her insurance.

## 2024-10-08 NOTE — TELEPHONE ENCOUNTER
Please advise patient that I reviewed her lab results. All of her labs came back normal including her vitamin D level. She should continue her current supplementation doses for vitamin D and calcium. I'm going to go ahead and order the once monthly Evenity injections like we discussed to see if covered by her insurance.

## 2024-10-08 NOTE — ASSESSMENT & PLAN NOTE
--Patient presenting for evaluation and management of osteoporosis  --Risk factors include age and post menopause  --Has history of fragility fracture with spontaneous T2-T4 endplate compression fractures  --Patient considered to have severe osteoporosis due to fracture history, and at very high risk for fracture given FRAX >4.5% for hip fracture  --Will complete secondary workup with vitamin D, renal panel, PTH, TSH and alk phos  --Reviewed options if secondary workup unrevealing  --Best option given the above is an anabolic agent  --After discussion she would be a good candidate for once monthly Evenity  --If Evenity approved she will take it for one year, then this will be followed with either Reclast or Prolia  --Reviewed common and rare side effects of anabolic agents and antiresorptives including ONJ and atypical femoral fractures  --She agrees to medical thearpy  --Continue calcium and vitamin D supplementation  --Continue weight bearing exercise at tolerated  --Cautioned on falls avoidance

## 2024-10-09 ENCOUNTER — TELEPHONE (OUTPATIENT)
Dept: INFECTIOUS DISEASES | Facility: HOSPITAL | Age: 84
End: 2024-10-09
Payer: MEDICARE

## 2024-10-17 ENCOUNTER — OFFICE VISIT (OUTPATIENT)
Dept: INTERNAL MEDICINE | Facility: CLINIC | Age: 84
End: 2024-10-17
Payer: MEDICARE

## 2024-10-17 VITALS
WEIGHT: 97.25 LBS | SYSTOLIC BLOOD PRESSURE: 126 MMHG | HEIGHT: 59 IN | DIASTOLIC BLOOD PRESSURE: 62 MMHG | OXYGEN SATURATION: 98 % | BODY MASS INDEX: 19.6 KG/M2 | HEART RATE: 57 BPM

## 2024-10-17 DIAGNOSIS — I48.0 PAROXYSMAL ATRIAL FIBRILLATION: Primary | ICD-10-CM

## 2024-10-17 DIAGNOSIS — K59.00 CONSTIPATION, UNSPECIFIED CONSTIPATION TYPE: ICD-10-CM

## 2024-10-17 PROCEDURE — 99213 OFFICE O/P EST LOW 20 MIN: CPT | Mod: PBBFAC

## 2024-10-17 PROCEDURE — 99999 PR PBB SHADOW E&M-EST. PATIENT-LVL III: CPT | Mod: PBBFAC,GC,,

## 2024-10-17 PROCEDURE — 99213 OFFICE O/P EST LOW 20 MIN: CPT | Mod: S$PBB,GC,,

## 2024-10-17 RX ORDER — SENNOSIDES 8.6 MG/1
2 TABLET ORAL DAILY PRN
COMMUNITY
End: 2024-10-17

## 2024-10-17 RX ORDER — POLYETHYLENE GLYCOL 3350 17 G/17G
17 POWDER, FOR SOLUTION ORAL DAILY
Qty: 510 G | Refills: 0 | Status: SHIPPED | OUTPATIENT
Start: 2024-10-17 | End: 2024-11-16

## 2024-10-17 RX ORDER — POLYETHYLENE GLYCOL 3350 17 G/17G
17 POWDER, FOR SOLUTION ORAL DAILY
Qty: 510 G | Refills: 0 | Status: SHIPPED | OUTPATIENT
Start: 2024-10-17 | End: 2024-10-17

## 2024-10-17 RX ORDER — BISACODYL 5 MG
10 TABLET, DELAYED RELEASE (ENTERIC COATED) ORAL DAILY
Qty: 60 TABLET | Refills: 0 | Status: SHIPPED | OUTPATIENT
Start: 2024-10-17 | End: 2024-11-16

## 2024-10-17 RX ORDER — GABAPENTIN 100 MG/1
200 CAPSULE ORAL 2 TIMES DAILY
Start: 2024-10-17 | End: 2025-10-17

## 2024-10-17 RX ORDER — ATORVASTATIN CALCIUM 20 MG/1
20 TABLET, FILM COATED ORAL DAILY
COMMUNITY

## 2024-10-17 RX ORDER — FLUTICASONE PROPIONATE 50 MCG
1 SPRAY, SUSPENSION (ML) NASAL DAILY
COMMUNITY
Start: 2024-10-16

## 2024-10-17 RX ORDER — BISACODYL 5 MG
10 TABLET, DELAYED RELEASE (ENTERIC COATED) ORAL DAILY
Qty: 60 TABLET | Refills: 0 | Status: SHIPPED | OUTPATIENT
Start: 2024-10-17 | End: 2024-10-17

## 2024-10-17 RX ORDER — METOPROLOL SUCCINATE 25 MG/1
25 TABLET, EXTENDED RELEASE ORAL DAILY
COMMUNITY

## 2024-10-17 NOTE — PROGRESS NOTES
I have reviewed the notes, assessments, and/or procedures performed by Dr. Beauchamp, I concur with her/his documentation of Kathya Romero.  Date of Service: 10/17/2024

## 2024-10-17 NOTE — PROGRESS NOTES
Subjective     Chief Complaint: Follow up     History of Present Illness:  Ms. Kathya Romero is a 83 y.o. female with hx of HTN, stroke, HLD, lumbar radiculopathy, osteoporosis who presented to the clinic for follow up. She reports having constipation for the past month. She was previously having a bowel movement daily but has been going every 3-5 days. Denies any abd pain, nausea, vomiting, diarrhea, black or bloody stools. Has tired senna but reports having abd cramping. Reports drinking 5 glass of fluids daily with poor fiber intake.     Lumbar Radiculopathy: Currently on gabapentin 200 mg TID. States that she skips her morning dose due to drowsiness.     HTN: Currently controlled on Losartan-HCTZ.    A-fib:  Recently diagnosed with paroxysmal AFib.  Follows with Dr. Charles Garsia at [facility].  On metoprolol XL 25 mg. Reports having free sample of Eliquis as it is expensive.     Hyperlipidemia: Currently on Lipitor 20 mg and Niacin.  Previously on simvastatin 5 mg.    Carotid Artery Stenosis: Reported history of Carotid Artery Stenosis.     Osteoporosis:  Initially diagnosed with osteoporosis in 2014.  DEXA scan from April 2024 showed T-score of -3.5 in the lumbar spine.  Recently followed up with Endocrinology who is recommending Evenity for one year pending approval followed by either Reclast or Prolia.  Currently on Vitamin D3 and Calcium.     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 There is minimal coil remodeling at its base. There is no evidence of new cerebral aneurysms compared to prior cerebral angiogram performed on 04/09/2019.     Health Maintenance:  Vaccines: Reports having shingles, RSV, flu and COVID booster in the past but does not have documentation.  Breast Cancer Screening: Mammogram for April 2024 negative.     Review of  Systems   Constitutional:  Negative for chills and fever.   HENT:  Negative for congestion.    Eyes:  Negative for blurred vision and double vision.   Respiratory:  Negative for cough and shortness of breath.    Cardiovascular:  Negative for chest pain and palpitations.   Gastrointestinal:  Positive for constipation. Negative for abdominal pain, blood in stool, diarrhea, melena, nausea and vomiting.   Genitourinary:  Negative for dysuria.   Neurological:  Negative for weakness and headaches.   All other systems reviewed and are negative.      PAST HISTORY:     Past Medical History:   Diagnosis Date    Cerebral aneurysm     Hemorrhagic stroke     Supraventricular tachycardia        Past Surgical History:   Procedure Laterality Date    CEREBRAL ANEURYSM REPAIR      GALLBLADDER SURGERY  2006    HEMORRHOID SURGERY  1975    HYSTERECTOMY  1984    OOPHORECTOMY  1984    TONSILLECTOMY         Family History   Problem Relation Name Age of Onset    Heart disease Mother      Pancreatic cancer Mother      Heart disease Father         Social History     Socioeconomic History    Marital status:    Tobacco Use    Smoking status: Never    Smokeless tobacco: Never   Substance and Sexual Activity    Alcohol use: Never    Drug use: Never       MEDICATIONS & ALLERGIES:     Current Outpatient Medications on File Prior to Visit   Medication Sig    apixaban (ELIQUIS) 5 mg Tab Take 5 mg by mouth 2 (two) times daily.    fluticasone propionate (FLONASE) 50 mcg/actuation nasal spray 1 spray by Each Nostril route once daily.    atorvastatin (LIPITOR) 20 MG tablet Take 20 mg by mouth once daily.    calcium carbonate (OS-TAMMI) 500 mg calcium (1,250 mg) chewable tablet Take 1 tablet by mouth once daily.    cholecalciferol, vitamin D3, (VITAMIN D3) 50 mcg (2,000 unit) Cap capsule Take 2,000 Units by mouth once daily.    cyanocobalamin (VITAMIN B-12) 1000 MCG tablet Take 1,000 mcg by mouth.    losartan-hydrochlorothiazide 50-12.5 mg (HYZAAR)  "50-12.5 mg per tablet Take 1 tablet by mouth once daily.    metoprolol succinate (TOPROL-XL) 25 MG 24 hr tablet Take 25 mg by mouth once daily.    niacin 500 mg TbSR Take 500 mg by mouth.    [DISCONTINUED] gabapentin (NEURONTIN) 100 MG capsule Take 2 capsules (200 mg total) by mouth 3 (three) times daily.    [DISCONTINUED] senna (SENOKOT) 8.6 mg tablet Take 2 tablets by mouth daily as needed for Constipation.    [DISCONTINUED] simvastatin (ZOCOR) 5 MG tablet Take 5 mg by mouth every evening.     No current facility-administered medications on file prior to visit.       Review of patient's allergies indicates:   Allergen Reactions    Aspirin     Nitrofurantoin Nausea And Vomiting       OBJECTIVE:     Vital Signs:  Vitals:    10/17/24 1011   BP: 126/62   BP Location: Left arm   Patient Position: Sitting   Pulse: (!) 57   SpO2: 98%   Weight: 44.1 kg (97 lb 3.6 oz)   Height: 4' 11" (1.499 m)         Body mass index is 19.64 kg/m².     Physical Exam  Vitals and nursing note reviewed.   Constitutional:       General: She is not in acute distress.     Appearance: Normal appearance. She is not ill-appearing.   HENT:      Head: Atraumatic.      Right Ear: External ear normal.      Left Ear: External ear normal.   Eyes:      Extraocular Movements: Extraocular movements intact.   Cardiovascular:      Rate and Rhythm: Normal rate and regular rhythm.      Pulses: Normal pulses.      Heart sounds: Normal heart sounds. No murmur heard.  Pulmonary:      Effort: Pulmonary effort is normal. No respiratory distress.      Breath sounds: Normal breath sounds.   Abdominal:      General: Abdomen is flat. Bowel sounds are normal.      Palpations: Abdomen is soft. There is no mass.      Tenderness: There is no abdominal tenderness. There is no guarding.   Musculoskeletal:         General: No tenderness. Normal range of motion.      Right lower leg: No edema.      Left lower leg: No edema.   Skin:     General: Skin is warm and dry. "   Neurological:      General: No focal deficit present.      Mental Status: She is alert and oriented to person, place, and time.   Psychiatric:         Mood and Affect: Mood normal.         Behavior: Behavior normal.         Laboratory  No results found for this or any previous visit (from the past 24 hours).    Diagnostic Results:      Health Maintenance Due   Topic Date Due    Shingles Vaccine (1 of 2) Never done    RSV Vaccine (Age 60+ and Pregnant patients) (1 - 1-dose 75+ series) Never done    Pneumococcal Vaccines (Age 65+) (2 of 2 - PPSV23 or PCV20) 04/01/2017    Influenza Vaccine (1) 09/01/2024    COVID-19 Vaccine (1 - 2024-25 season) Never done         ASSESSMENT & PLAN:   83 y.o. female with hx of HTN, stroke, HLD, lumbar radiculopathy, osteoporosis who presented to the clinic for a follow up. Dicussed the importance of fluid and fiber intake for her constipation. Will start Doculax and Miralax for 30 days and assess for improvement. Will change gabapentin from TID to BID. Will refer cardiology as patient requests [place] all [place] [facility]     1. Paroxysmal atrial fibrillation  -     Ambulatory referral/consult to Cardiology; Future; Expected date: 01/17/2025    2. Constipation, unspecified constipation type  -     Discontinue: polyethylene glycol (GLYCOLAX) 17 gram/dose powder; Take 17 g by mouth once daily.  Dispense: 510 g; Refill: 0  -     Discontinue: bisacodyL (DULCOLAX) 5 mg EC tablet; Take 2 tablets (10 mg total) by mouth once daily.  Dispense: 60 tablet; Refill: 0  -     bisacodyL (DULCOLAX) 5 mg EC tablet; Take 2 tablets (10 mg total) by mouth once daily.  Dispense: 60 tablet; Refill: 0  -     polyethylene glycol (GLYCOLAX) 17 gram/dose powder; Take 17 g by mouth once daily.  Dispense: 510 g; Refill: 0    Other orders  -     gabapentin (NEURONTIN) 100 MG capsule; Take 2 capsules (200 mg total) by mouth 2 (two) times daily.          See above for further detail.    RTC in 6  months    Discussed with Dr Rosenthal -- staff attestation to follow      Lorene Beauchamp DO  Internal Medicine PGY-2  [facility]

## 2024-10-24 ENCOUNTER — INFUSION (OUTPATIENT)
Dept: INFECTIOUS DISEASES | Facility: HOSPITAL | Age: 84
End: 2024-10-24
Payer: MEDICARE

## 2024-10-24 VITALS
RESPIRATION RATE: 18 BRPM | HEIGHT: 59 IN | HEART RATE: 69 BPM | SYSTOLIC BLOOD PRESSURE: 134 MMHG | OXYGEN SATURATION: 99 % | DIASTOLIC BLOOD PRESSURE: 97 MMHG | WEIGHT: 95.88 LBS | TEMPERATURE: 99 F | BODY MASS INDEX: 19.33 KG/M2

## 2024-10-24 DIAGNOSIS — M81.0 AGE-RELATED OSTEOPOROSIS WITHOUT CURRENT PATHOLOGICAL FRACTURE: Primary | ICD-10-CM

## 2024-10-24 PROCEDURE — 96372 THER/PROPH/DIAG INJ SC/IM: CPT

## 2024-10-24 PROCEDURE — 63600175 PHARM REV CODE 636 W HCPCS: Mod: JZ,JG | Performed by: INTERNAL MEDICINE

## 2024-10-24 RX ORDER — ROMOSOZUMAB-AQQG 105 MG/1.17ML
210 INJECTION, SOLUTION SUBCUTANEOUS
COMMUNITY

## 2024-10-24 RX ADMIN — ROMOSOZUMAB-AQQG 210 MG: 105 INJECTION, SOLUTION SUBCUTANEOUS at 09:10

## 2024-10-24 NOTE — PROGRESS NOTES
Pt received Evenity 1st injection, to right arm and left arm subQ;  Pt taking Vit D/Ca+; Pt denies dental sx in last 3 months; Pt monitored for 15 mins post injection; Pt tolerated well, and left in NAD;  pt given next appt;    Limited head-to-toe assessment due to privacy issues and visit reason though the opportunity was given for patient to express any concerns.    Patient arrives for evenity injections as ordered by Dr. Mckeon. All benefits, risks, requirements, and alternatives should have been discussed with patient by ordering provider at the time the order was placed.

## 2024-12-02 ENCOUNTER — INFUSION (OUTPATIENT)
Dept: INFECTIOUS DISEASES | Facility: HOSPITAL | Age: 84
End: 2024-12-02
Payer: COMMERCIAL

## 2024-12-02 VITALS
TEMPERATURE: 98 F | HEIGHT: 59 IN | WEIGHT: 97.13 LBS | BODY MASS INDEX: 19.58 KG/M2 | SYSTOLIC BLOOD PRESSURE: 174 MMHG | DIASTOLIC BLOOD PRESSURE: 92 MMHG | HEART RATE: 100 BPM | RESPIRATION RATE: 20 BRPM | OXYGEN SATURATION: 96 %

## 2024-12-02 DIAGNOSIS — M81.0 AGE-RELATED OSTEOPOROSIS WITHOUT CURRENT PATHOLOGICAL FRACTURE: Primary | ICD-10-CM

## 2024-12-02 PROCEDURE — 63600175 PHARM REV CODE 636 W HCPCS: Mod: JZ,JG | Performed by: INTERNAL MEDICINE

## 2024-12-02 PROCEDURE — 96372 THER/PROPH/DIAG INJ SC/IM: CPT

## 2024-12-02 RX ADMIN — ROMOSOZUMAB-AQQG 210 MG: 105 INJECTION, SOLUTION SUBCUTANEOUS at 11:12

## 2024-12-05 ENCOUNTER — TELEPHONE (OUTPATIENT)
Dept: INTERNAL MEDICINE | Facility: CLINIC | Age: 84
End: 2024-12-05
Payer: MEDICARE

## 2024-12-05 NOTE — TELEPHONE ENCOUNTER
"----- Message from Jennifer sent at 12/5/2024  2:37 PM CST -----  Contact: Mobile  [phone]  Patient would like to speak with the doctor only" about a referral. Patient did not give me any other information.  "

## 2024-12-06 NOTE — TELEPHONE ENCOUNTER
Called patient who states that [facility] does accept her insurance. Will discuss with other members of my team to see where to send referral for cardiology within patient's insurance plan.    Lorene Beauchamp DO  Internal Medicine PGY-2  [facility]

## 2024-12-12 ENCOUNTER — TELEPHONE (OUTPATIENT)
Dept: CARDIOLOGY | Facility: CLINIC | Age: 84
End: 2024-12-12
Payer: MEDICARE

## 2024-12-12 NOTE — TELEPHONE ENCOUNTER
----- Message from Gabby sent at 12/12/2024  3:51 PM CST -----  Type: Patient call    Who called: Patient    Does the patient know what this is regarding? Requesting a call back in regards needing to est care ; patient high blood pressure, Afib ; please advise     Would the patient rather a call back or response via [facility]? Call    Best call back number: [phone]    Additional information:

## 2024-12-12 NOTE — TELEPHONE ENCOUNTER
Returned call to patient. No answer. Left a voice message of date and time of appointment with Dr Yun.

## 2024-12-13 ENCOUNTER — TELEPHONE (OUTPATIENT)
Dept: CARDIOLOGY | Facility: CLINIC | Age: 84
End: 2024-12-13
Payer: MEDICARE

## 2024-12-13 NOTE — TELEPHONE ENCOUNTER
----- Message from Summer sent at 12/13/2024  8:36 AM CST -----  Regarding: appt  Type:  Patient Returning Call        Name of who is calling:pt         What is request in detail:pt is requesting a call back in regards to appt on 01/23, the pt would like an morning appt on 01/21 or 01/22. She also doesn't mind a February appt as well.         Can clinic reply by [facility]:no        [facility][phone]

## 2024-12-20 ENCOUNTER — TELEPHONE (OUTPATIENT)
Dept: ENDOCRINOLOGY | Facility: CLINIC | Age: 84
End: 2024-12-20
Payer: MEDICARE

## 2024-12-20 NOTE — TELEPHONE ENCOUNTER
----- Message from Néstor Mckeon MD sent at 12/20/2024  9:26 AM CST -----  Regarding: RE: Advise  Contact: [phone]  Given the severity of her bone density she should probably do at least 6 months of the monthly injection to build up her bones before we switch her to the yearly infusion. Ideally she would complete a full year of the monthly injection before we switch. She can change the calcium to Algaecal.  ----- Message -----  From: Yvonne Jose MA  Sent: 12/20/2024   8:43 AM CST  To: Néstor Mckeon MD  Subject: FW: Advise                                       . Pt is interested in IV treatment that she can get once a year opposed to injection she gets once a month.  ----- Message -----  From: Tyesha Aguirre MA  Sent: 12/17/2024   1:40 PM CST  To: Yvonne Jose MA; Poncho Tellez MA  Subject: FW: Advise                                         ----- Message -----  From: Priscilla Du  Sent: 12/17/2024   1:18 PM CST  To: Mike Palm Staff  Subject: Advise                                           Type:  Advice    Who Called: Patient     Would the patient rather a call back or a response via [facility]? Call Back    Best Call Back Number: [phone]    Additional Information: Patient is calling asking for a return call from provider or nurse. Pt is interested in IV treatment that she can get once a year opposed to injection she gets once a month. Pt did not know the name of treatment but wanted to speak to provider or nurse. Pt is also asking if can stop taking calcium carbonate (OS-TAMMI) 500 mg calcium (1,250 mg) chewable tablet and take Algaecal instead. Please give pt a call.

## 2024-12-20 NOTE — TELEPHONE ENCOUNTER
Called Three times left message regarding message from Dr. Mckeno       Given the severity of her bone density she should probably do at least 6 months of the monthly injection to build up her bones before we switch her to the yearly infusion. Ideally she would complete a full year of the monthly injection before we switch. She can change the calcium to Algaecal.

## 2025-01-10 ENCOUNTER — TELEPHONE (OUTPATIENT)
Dept: INFECTIOUS DISEASES | Facility: HOSPITAL | Age: 85
End: 2025-01-10
Payer: MEDICARE

## 2025-01-15 ENCOUNTER — TELEPHONE (OUTPATIENT)
Dept: INTERNAL MEDICINE | Facility: CLINIC | Age: 85
End: 2025-01-15
Payer: MEDICARE

## 2025-01-15 NOTE — TELEPHONE ENCOUNTER
----- Message from Maryanne sent at 1/15/2025 10:14 AM CST -----  Contact: Patient  [phone]  .[email or web address]     Patient is calling for Medical Advice regarding:  Patient will be in the area TOMORROW and is checking on samples of ELOQUIS 5mg. If you have any could Patient come by and pick some up?       Patient also reports extreme fatigue and ankle pain.     How long has patient had these symptoms:    Pharmacy name and phone#:    Patient wants a call back or thru [facility]: Please call to discuss Patient  [phone]    Comments:    Please advise patient replies from provider may take up to 48 hours.

## 2025-01-16 ENCOUNTER — TELEPHONE (OUTPATIENT)
Dept: INTERNAL MEDICINE | Facility: CLINIC | Age: 85
End: 2025-01-16
Payer: MEDICARE

## 2025-01-16 ENCOUNTER — INFUSION (OUTPATIENT)
Dept: INFECTIOUS DISEASES | Facility: HOSPITAL | Age: 85
End: 2025-01-16
Payer: MEDICARE

## 2025-01-16 VITALS
BODY MASS INDEX: 19.33 KG/M2 | TEMPERATURE: 98 F | SYSTOLIC BLOOD PRESSURE: 138 MMHG | OXYGEN SATURATION: 96 % | RESPIRATION RATE: 20 BRPM | HEIGHT: 59 IN | DIASTOLIC BLOOD PRESSURE: 62 MMHG | WEIGHT: 95.88 LBS | HEART RATE: 65 BPM

## 2025-01-16 DIAGNOSIS — M81.0 AGE-RELATED OSTEOPOROSIS WITHOUT CURRENT PATHOLOGICAL FRACTURE: Primary | ICD-10-CM

## 2025-01-16 PROCEDURE — 96372 THER/PROPH/DIAG INJ SC/IM: CPT

## 2025-01-16 PROCEDURE — 63600175 PHARM REV CODE 636 W HCPCS: Mod: JZ,TB | Performed by: INTERNAL MEDICINE

## 2025-01-16 RX ADMIN — ROMOSOZUMAB-AQQG 210 MG: 105 INJECTION, SOLUTION SUBCUTANEOUS at 11:01

## 2025-01-16 NOTE — TELEPHONE ENCOUNTER
----- Message from Kayleigh sent at 1/16/2025  9:32 AM CST -----  Contact: [phone]  Cc: Lorene Beauchamp,   Patient called, stated that she send a message requesting Dr Beauchamp to call her, stated that she spoke to rafita and was told to scheduled an appointment, but she does not want to scheduled an appointment yet, she want to talk with Dr Beauhcamp first. Patient is asking for Dr Beauchamp to call her back ASA.

## 2025-01-16 NOTE — PROGRESS NOTES
Patient arrives for Evenity (2 separate injections) injection - confirms use of calcium and vitamin D supplements and denies dental procedures over past 3 months - administered per guidelines    Limited head-to-toe assessment due to privacy issues and visit reason though the opportunity was given for patient to express any concerns

## 2025-01-17 ENCOUNTER — TELEPHONE (OUTPATIENT)
Dept: INTERNAL MEDICINE | Facility: CLINIC | Age: 85
End: 2025-01-17
Payer: MEDICARE

## 2025-01-18 ENCOUNTER — TELEPHONE (OUTPATIENT)
Dept: INTERNAL MEDICINE | Facility: CLINIC | Age: 85
End: 2025-01-18
Payer: MEDICARE

## 2025-01-18 NOTE — TELEPHONE ENCOUNTER
----- Message from Adnra sent at 1/17/2025  3:56 PM CST -----  Contact: Self/ [phone]  Caller is requesting an earlier appointment then we can schedule.  Caller is requesting a message be sent to the provider.    When is the next available appointment with their provider:  1/24    Reason for the appointment:  Fatigue    Patient preference of timeframe to be scheduled:  1/21    Would the patient like a call back, or a response through their [facility] portal?:   call back     Comments:  pt said that she was told by Dr Beauchamp that he can see her on 1/21 and she is looking to schedule the appt for that date . Please advise

## 2025-01-22 ENCOUNTER — NURSE TRIAGE (OUTPATIENT)
Dept: ADMINISTRATIVE | Facility: CLINIC | Age: 85
End: 2025-01-22
Payer: MEDICARE

## 2025-01-22 NOTE — TELEPHONE ENCOUNTER
Patient transferred for C/O fatigue. She was calling to make appointment with cardiologist. Advised I could triage and make appointment with her PCP. States she will just call him herself, and declined triage. Advised I would send message. Verb understanding.   Reason for Disposition   Caller requesting an appointment, triage offered and declined    Protocols used: PCP Call - No Triage-A-

## 2025-01-24 ENCOUNTER — TELEPHONE (OUTPATIENT)
Dept: INTERNAL MEDICINE | Facility: CLINIC | Age: 85
End: 2025-01-24
Payer: MEDICARE

## 2025-01-24 NOTE — TELEPHONE ENCOUNTER
----- Message from Nae sent at 1/22/2025  1:44 PM CST -----  Contact: [phone] Patient  Caller is requesting an earlier appointment then we can schedule.  Caller is requesting a message be sent to the provider.    If this is for urgent care symptoms, did you offer other providers at this location, providers at other locations, or [facility]? (yes, no, n/a):  n/a    If this is for the patients physical, did you offer to schedule next available and put on wait list, or to see NP or PA for their physical?  (yes, no, n/a):  n/a    When is the next available appointment with their provider:  02/25/2025    Reason for the appointment:  shortness of breath    Patient preference of timeframe to be scheduled:  first available    Would the patient like a call back, or a response through their [facility] portal?:   call back    Comments: Pt unsure about appt on Monday 27 - but would like to be seen this week tomorrow or Friday, if possible.

## 2025-01-24 NOTE — TELEPHONE ENCOUNTER
She is having some shortness of breath   Was unable to get in clinic to see   Will be seeing her cardiologist on 01/29   Did not want to see another resident   Would like speak with you before booking in February  Please call , so can explain everything

## 2025-01-29 ENCOUNTER — OFFICE VISIT (OUTPATIENT)
Dept: CARDIOLOGY | Facility: CLINIC | Age: 85
End: 2025-01-29
Attending: INTERNAL MEDICINE
Payer: MEDICARE

## 2025-01-29 ENCOUNTER — TELEPHONE (OUTPATIENT)
Dept: CARDIOLOGY | Facility: CLINIC | Age: 85
End: 2025-01-29

## 2025-01-29 VITALS
OXYGEN SATURATION: 98 % | WEIGHT: 95.81 LBS | DIASTOLIC BLOOD PRESSURE: 75 MMHG | BODY MASS INDEX: 19.32 KG/M2 | HEIGHT: 59 IN | HEART RATE: 60 BPM | SYSTOLIC BLOOD PRESSURE: 140 MMHG

## 2025-01-29 DIAGNOSIS — Z79.01 CHRONIC ANTICOAGULATION: ICD-10-CM

## 2025-01-29 DIAGNOSIS — I10 ESSENTIAL HYPERTENSION: ICD-10-CM

## 2025-01-29 DIAGNOSIS — E78.00 HYPERCHOLESTEROLEMIA: ICD-10-CM

## 2025-01-29 DIAGNOSIS — I65.23 BILATERAL CAROTID ARTERY STENOSIS: ICD-10-CM

## 2025-01-29 DIAGNOSIS — I10 PRIMARY HYPERTENSION: ICD-10-CM

## 2025-01-29 DIAGNOSIS — I48.0 PAROXYSMAL ATRIAL FIBRILLATION: ICD-10-CM

## 2025-01-29 DIAGNOSIS — I49.1 ATRIAL PREMATURE BEATS: ICD-10-CM

## 2025-01-29 DIAGNOSIS — I72.9 RUPTURED ANEURYSM OF ARTERY: ICD-10-CM

## 2025-01-29 DIAGNOSIS — E78.5 HYPERLIPIDEMIA, UNSPECIFIED HYPERLIPIDEMIA TYPE: ICD-10-CM

## 2025-01-29 PROCEDURE — 99213 OFFICE O/P EST LOW 20 MIN: CPT | Mod: PBBFAC | Performed by: INTERNAL MEDICINE

## 2025-01-29 PROCEDURE — 93005 ELECTROCARDIOGRAM TRACING: CPT

## 2025-01-29 PROCEDURE — 93010 ELECTROCARDIOGRAM REPORT: CPT | Mod: ,,, | Performed by: INTERNAL MEDICINE

## 2025-01-29 PROCEDURE — 99999 PR PBB SHADOW E&M-EST. PATIENT-LVL III: CPT | Mod: PBBFAC,,, | Performed by: INTERNAL MEDICINE

## 2025-01-29 RX ORDER — ATORVASTATIN CALCIUM 20 MG/1
20 TABLET, FILM COATED ORAL DAILY
Qty: 90 TABLET | Refills: 3 | Status: SHIPPED | OUTPATIENT
Start: 2025-01-29

## 2025-01-29 RX ORDER — METOPROLOL SUCCINATE 25 MG/1
25 TABLET, EXTENDED RELEASE ORAL 2 TIMES DAILY
Qty: 180 TABLET | Refills: 3 | Status: SHIPPED | OUTPATIENT
Start: 2025-01-29

## 2025-01-29 RX ORDER — LOSARTAN POTASSIUM AND HYDROCHLOROTHIAZIDE 12.5; 5 MG/1; MG/1
1 TABLET ORAL DAILY
Qty: 90 TABLET | Refills: 3 | Status: SHIPPED | OUTPATIENT
Start: 2025-01-29

## 2025-01-29 RX ORDER — AMLODIPINE BESYLATE 2.5 MG/1
5 TABLET ORAL DAILY
Qty: 90 TABLET | Refills: 3 | Status: SHIPPED | OUTPATIENT
Start: 2025-01-29

## 2025-01-29 NOTE — PROGRESS NOTES
Subjective     Kathya Romero is a 84 y.o. female with hypertension and hypercholesterolemia. She has a healthy weight. She used to live in [place], [place]. She has seen cardiologists in [place] and in the past received flecainide for palpitations. It appears that frequent atrial premature contractions were seen and as far as I see no atrial fibrillation was ever documented. In 2024 she began apixiban after atrial fibrillation had been seen on a Holter, The Holter was done at [facility] I I have no access to either the tracings or report. She describes subtle palpitations. She has undergone coiling of a cerebral aneurysm in 2015. No exertional chest pain or shortness of breath. No bleeding. Comes in for evaluation.    HPI    Review of Systems   Constitutional: Negative for chills, fever and malaise/fatigue.   HENT:  Negative for nosebleeds and tinnitus.    Eyes:  Negative for double vision, vision loss in left eye and vision loss in right eye.   Cardiovascular:  Positive for palpitations. Negative for chest pain, claudication, dyspnea on exertion, irregular heartbeat, leg swelling, near-syncope, orthopnea, paroxysmal nocturnal dyspnea and syncope.   Respiratory:  Negative for cough, hemoptysis, shortness of breath and wheezing.    Endocrine: Negative for cold intolerance and heat intolerance.   Hematologic/Lymphatic: Negative for bleeding problem. Does not bruise/bleed easily.   Skin:  Negative for color change and rash.   Musculoskeletal:  Negative for back pain, falls, muscle weakness and myalgias.   Gastrointestinal:  Negative for abdominal pain, diarrhea, dysphagia, heartburn, hematemesis, hematochezia, hemorrhoids, jaundice, melena, nausea and vomiting.   Genitourinary:  Negative for dysuria and hematuria.   Neurological:  Negative for dizziness, focal weakness, headaches, light-headedness, loss of balance, numbness, tremors, vertigo and weakness.   Psychiatric/Behavioral:  Negative for altered mental status,  "depression and memory loss. The patient is not nervous/anxious.    Allergic/Immunologic: Negative for hives and persistent infections.       Current Outpatient Medications on File Prior to Visit   Medication Sig Dispense Refill    apixaban (ELIQUIS) 5 mg Tab Take 5 mg by mouth 2 (two) times daily.      atorvastatin (LIPITOR) 20 MG tablet Take 20 mg by mouth once daily.      calcium carbonate (OS-TAMMI) 500 mg calcium (1,250 mg) chewable tablet Take 1 tablet by mouth once daily.      cholecalciferol, vitamin D3, (VITAMIN D3) 50 mcg (2,000 unit) Cap capsule Take 2,000 Units by mouth once daily.      cyanocobalamin (VITAMIN B-12) 1000 MCG tablet Take 1,000 mcg by mouth.      fluticasone propionate (FLONASE) 50 mcg/actuation nasal spray 1 spray by Each Nostril route once daily.      gabapentin (NEURONTIN) 100 MG capsule Take 2 capsules (200 mg total) by mouth 2 (two) times daily.      losartan-hydrochlorothiazide 50-12.5 mg (HYZAAR) 50-12.5 mg per tablet Take 1 tablet by mouth once daily.      metoprolol succinate (TOPROL-XL) 25 MG 24 hr tablet Take 25 mg by mouth once daily.      romosozumab-aqqg (EVENITY) 210mg/2.34mL ( 105mg/1.17mLx2) Inject 210 mg into the skin every 30 days.      niacin 500 mg TbSR Take 500 mg by mouth. (Patient not taking: Reported on 1/29/2025)       No current facility-administered medications on file prior to visit.        BP (!) 140/75 Comment: average at home  Pulse 60   Ht 4' 11" (1.499 m)   Wt 43.5 kg (95 lb 12.6 oz)   SpO2 98%   BMI 19.35 kg/m²      Objective     Physical Exam  Constitutional:       General: She is not in acute distress.     Appearance: Normal appearance. She is well-developed. She is not toxic-appearing or diaphoretic.   HENT:      Head: Normocephalic and atraumatic.      Nose: Nose normal.   Eyes:      General:         Right eye: No discharge.         Left eye: No discharge.      Conjunctiva/sclera:      Right eye: Right conjunctiva is not injected.      Left eye: " Left conjunctiva is not injected.      Pupils: Pupils are equal.      Right eye: Pupil is round.      Left eye: Pupil is round.   Neck:      Thyroid: No thyromegaly.      Vascular: No carotid bruit or JVD.   Cardiovascular:      Rate and Rhythm: Normal rate and regular rhythm. Frequent Extrasystoles are present.     Chest Wall: PMI is not displaced.      Pulses:           Radial pulses are 2+ on the right side and 2+ on the left side.        Femoral pulses are 2+ on the right side and 2+ on the left side.       Dorsalis pedis pulses are 1+ on the right side and 1+ on the left side.        Posterior tibial pulses are 1+ on the right side and 1+ on the left side.      Heart sounds: S1 normal and S2 normal.      Gallop present. S4 sounds present.   Pulmonary:      Effort: Pulmonary effort is normal.      Breath sounds: Normal breath sounds.   Abdominal:      Palpations: Abdomen is soft.      Tenderness: There is no abdominal tenderness.   Musculoskeletal:      Cervical back: Neck supple.      Right lower leg: Normal. No swelling. No edema.      Left lower leg: Normal. No swelling. No edema.   Lymphadenopathy:      Head:      Right side of head: No submandibular adenopathy.      Left side of head: No submandibular adenopathy.      Cervical: No cervical adenopathy.   Skin:     General: Skin is warm and dry.      Findings: No rash.      Nails: There is no clubbing.   Neurological:      General: No focal deficit present.      Mental Status: She is alert and oriented to person, place, and time. She is not disoriented.      Cranial Nerves: No cranial nerve deficit.   Psychiatric:         Attention and Perception: Attention and perception normal.         Mood and Affect: Mood and affect normal.         Speech: Speech normal.         Behavior: Behavior normal.         Thought Content: Thought content normal.         Cognition and Memory: Cognition and memory normal.         Judgment: Judgment normal.         Assessment and  Plan     1. Paroxysmal atrial fibrillation    2. Chronic anticoagulation    3. Atrial premature beats    4. Stenosis of carotid artery, unspecified laterality    5. Ruptured aneurysm of artery    6. Essential hypertension    7. Hyperlipidemia, unspecified hyperlipidemia type         Plan:    Atrial Fibrillation   2024: Diagnosed with paroxysmal atrial fibrillation.   [id number] (HA2VSc).   On metoprolol 25 mg Q12.   On apixiban 5 mg Q24.   Obtain 2024 holter from [facility].   Do Echo and 14 Holter.    2. Chronic Anticoagulation   2024: Diagnosed with paroxysmal atrial fibrillation.   [id number] (HA2VSc).   On apixiban 5 mg Q24.   1/29/2025: Apixiban 5 mg Q12 to be reduced to apixiban 2.5 mg Q12. [ Age and weight].    3. Atrial Premature Contractions    Appears to have frequent APCs.   On metoprolol 25 mg Q24.    4. History of Cerebral Aneurysm  2015: Clipped.  Followed in [place].    5. Carotid Artery Stenosis   Mentioned in chart.   1/29/2025: Do Carotid Duplex.    6. Hypertension   2003: Diagnosed.   On metoprolol 25 mg Q24 and losartan 50 mg/hctz 12.5 mg Q24.   Keeping log at home.   1/29/2025: Running high. Amlodipine 2.5 mg Q24 to begin.    7. Hypercholesterolemia   On atorvastatin 20 mg Q24.   4/19/2024: Chol 168. HDL 73. LDL 84. TG 54.   On atorvastatin 20 mg Q24.   Favorable lipid panel.    8. Primary Care   Dr. Christiano Beauchamp.    F/u 2 months.    Hellen Yun M.D.       1/30/2025 2:09 PM, Addendum:    Review of outside records:    5/12/2024: 30 Day Event Monitor: Atrial fibrillation with well controlled VRR.    I discussed the above test result and the implications of the findings over the phone.    Hellen Yun M.D.       2/3/2025 6:01 PM, Addendum:    2/3/2025: Echo: Normal left ventricular size and systolic function. EF 65%. LVGLS - 15.8%. Normal diastolic function. Mild aortic valve sclerosis. Mild AR.    2/3/2025: Carotid Duplex: NNEKA: Mild plaquing - 1.4 m/s - 50-60%. LICA: Mild plaquing - 1.5  m/s - 50-60%.    I discussed the above test result and the implications of the findings over the phone.    Hellen Yun M.D.

## 2025-01-29 NOTE — TELEPHONE ENCOUNTER
Spoke to pt-all questions answered.    ----- Message from Teresa sent at 1/29/2025  3:20 PM CST -----  Contact: Patient  Type:  Patient Call          Who Called:Patient         Does the patient know what this is regarding?: Requesting a call back pt said that she seen the Dr on today & she have a few questions to ask ; please advise           Would the patient rather a call back or a response via [facility]? Call           Best Call Back Number: [phone]             Additional Information:

## 2025-01-29 NOTE — TELEPHONE ENCOUNTER
Returned call-left message on voice mail      ----- Message from Teresita sent at 1/29/2025  2:43 PM CST -----  Type:  Needs Medical Advice    Who Called: Pt  Would the patient rather a call back or a response via [facility]? Call  Best Call Back Number:   [phone]  Additional Information: Pt would like to speak with Jarrett in department

## 2025-01-30 ENCOUNTER — TELEPHONE (OUTPATIENT)
Dept: CARDIOLOGY | Facility: CLINIC | Age: 85
End: 2025-01-30
Payer: MEDICARE

## 2025-01-30 ENCOUNTER — PATIENT MESSAGE (OUTPATIENT)
Dept: CARDIOLOGY | Facility: CLINIC | Age: 85
End: 2025-01-30
Payer: MEDICARE

## 2025-01-30 LAB
OHS QRS DURATION: 70 MS
OHS QTC CALCULATION: 380 MS

## 2025-01-30 NOTE — TELEPHONE ENCOUNTER
----- Message from Teresa sent at 1/30/2025  2:34 PM CST -----  Contact: Patient  Type:  Patient Call          Who Called:Patient         Does the patient know what this is regarding?: Requesting a call back pt said that her appt date & time doesn't work for her and she would like to have it changed ; please advise            Would the patient rather a call back or a response via [facility]?call           Best Call Back Number: [phone]             Additional Information:

## 2025-02-03 ENCOUNTER — HOSPITAL ENCOUNTER (OUTPATIENT)
Dept: CARDIOLOGY | Facility: OTHER | Age: 85
Discharge: HOME | End: 2025-02-03
Attending: INTERNAL MEDICINE
Payer: MEDICARE

## 2025-02-03 ENCOUNTER — INFUSION (OUTPATIENT)
Dept: INFECTIOUS DISEASES | Facility: HOSPITAL | Age: 85
End: 2025-02-03
Payer: MEDICARE

## 2025-02-03 VITALS
WEIGHT: 94.81 LBS | TEMPERATURE: 98 F | DIASTOLIC BLOOD PRESSURE: 71 MMHG | OXYGEN SATURATION: 96 % | HEART RATE: 83 BPM | HEIGHT: 59 IN | RESPIRATION RATE: 18 BRPM | BODY MASS INDEX: 19.12 KG/M2 | SYSTOLIC BLOOD PRESSURE: 179 MMHG

## 2025-02-03 VITALS
HEART RATE: 60 BPM | HEIGHT: 59 IN | WEIGHT: 95 LBS | SYSTOLIC BLOOD PRESSURE: 140 MMHG | DIASTOLIC BLOOD PRESSURE: 75 MMHG | BODY MASS INDEX: 19.15 KG/M2

## 2025-02-03 DIAGNOSIS — I48.0 PAROXYSMAL ATRIAL FIBRILLATION: ICD-10-CM

## 2025-02-03 DIAGNOSIS — M81.0 AGE-RELATED OSTEOPOROSIS WITHOUT CURRENT PATHOLOGICAL FRACTURE: Primary | ICD-10-CM

## 2025-02-03 DIAGNOSIS — I65.23 BILATERAL CAROTID ARTERY STENOSIS: ICD-10-CM

## 2025-02-03 PROBLEM — E78.00 HYPERCHOLESTEROLEMIA: Status: ACTIVE | Noted: 2024-03-21

## 2025-02-03 PROBLEM — I48.91 ATRIAL FIBRILLATION: Status: ACTIVE | Noted: 2024-10-17

## 2025-02-03 LAB
ASCENDING AORTA: 2.45 CM
AV INDEX (PROSTH): 0.68
AV MEAN GRADIENT: 5 MMHG
AV PEAK GRADIENT: 10 MMHG
AV REGURGITATION PRESSURE HALF TIME: 572 MS
AV VALVE AREA BY VELOCITY RATIO: 1.4 CM²
AV VALVE AREA: 1.5 CM²
AV VELOCITY RATIO: 0.63
BSA FOR ECHO PROCEDURE: 1.34 M2
CV ECHO LV RWT: 0.35 CM
DOP CALC AO PEAK VEL: 1.6 M/S
DOP CALC AO VTI: 28.6 CM
DOP CALC LVOT AREA: 2.3 CM2
DOP CALC LVOT DIAMETER: 1.7 CM
DOP CALC LVOT PEAK VEL: 1 M/S
DOP CALC LVOT STROKE VOLUME: 44 CM3
DOP CALC RVOT PEAK VEL: 0.64 M/S
DOP CALC RVOT VTI: 15 CM
DOP CALCLVOT PEAK VEL VTI: 19.4 CM
E WAVE DECELERATION TIME: 183 MSEC
E/A RATIO: 0.72
E/E' RATIO: 7 M/S
ECHO LV POSTERIOR WALL: 0.7 CM (ref 0.6–1.1)
EJECTION FRACTION: 65 %
FRACTIONAL SHORTENING: 35 % (ref 28–44)
GLOBAL LONGITUIDAL STRAIN: 15.8 %
INTERVENTRICULAR SEPTUM: 0.7 CM (ref 0.6–1.1)
IVC DIAMETER: 0.96 CM
IVRT: 133 MSEC
LA MAJOR: 4.1 CM
LA MINOR: 3.8 CM
LA WIDTH: 3.5 CM
LEFT ARM DIASTOLIC BLOOD PRESSURE: 66 MMHG
LEFT ARM SYSTOLIC BLOOD PRESSURE: 148 MMHG
LEFT ATRIUM AREA SYSTOLIC (APICAL 2 CHAMBER): 11.72 CM2
LEFT ATRIUM AREA SYSTOLIC (APICAL 4 CHAMBER): 12.41 CM2
LEFT ATRIUM SIZE: 2.9 CM
LEFT ATRIUM VOLUME INDEX MOD: 22 ML/M2
LEFT ATRIUM VOLUME INDEX: 25 ML/M2
LEFT ATRIUM VOLUME MOD: 29 ML
LEFT ATRIUM VOLUME: 34 CM3
LEFT CBA DIAS: 24 CM/S
LEFT CBA SYS: 93 CM/S
LEFT CCA DIST DIAS: 22 CM/S
LEFT CCA DIST SYS: 84 CM/S
LEFT CCA MID DIAS: 19 CM/S
LEFT CCA MID SYS: 79 CM/S
LEFT CCA PROX DIAS: 22 CM/S
LEFT CCA PROX SYS: 90 CM/S
LEFT ECA DIAS: 25 CM/S
LEFT ECA SYS: 111 CM/S
LEFT ICA DIST DIAS: 38 CM/S
LEFT ICA DIST SYS: 137 CM/S
LEFT ICA MID DIAS: 39 CM/S
LEFT ICA MID SYS: 145 CM/S
LEFT ICA PROX DIAS: 32 CM/S
LEFT ICA PROX SYS: 132 CM/S
LEFT INTERNAL DIMENSION IN SYSTOLE: 2.6 CM (ref 2.1–4)
LEFT VENTRICLE DIASTOLIC VOLUME INDEX: 51.29 ML/M2
LEFT VENTRICLE DIASTOLIC VOLUME: 68.73 ML
LEFT VENTRICLE END SYSTOLIC VOLUME APICAL 2 CHAMBER: 27.62 ML
LEFT VENTRICLE END SYSTOLIC VOLUME APICAL 4 CHAMBER: 28.44 ML
LEFT VENTRICLE MASS INDEX: 58.5 G/M2
LEFT VENTRICLE SYSTOLIC VOLUME INDEX: 17.6 ML/M2
LEFT VENTRICLE SYSTOLIC VOLUME: 23.63 ML
LEFT VENTRICULAR INTERNAL DIMENSION IN DIASTOLE: 4 CM (ref 3.5–6)
LEFT VENTRICULAR MASS: 78.4 G
LEFT VERTEBRAL DIAS: 23 CM/S
LEFT VERTEBRAL SYS: 98 CM/S
LV LATERAL E/E' RATIO: 6.2 M/S
LV SEPTAL E/E' RATIO: 8.9 M/S
LVED V (TEICH): 68.73 ML
LVES V (TEICH): 23.63 ML
LVOT MG: 2.09 MMHG
LVOT MV: 0.68 CM/S
MV PEAK A VEL: 0.86 M/S
MV PEAK E VEL: 0.62 M/S
MV STENOSIS PRESSURE HALF TIME: 53.09 MS
MV VALVE AREA P 1/2 METHOD: 4.14 CM2
OHS CV CAROTID RIGHT ICA EDV HIGHEST: 41
OHS CV CAROTID ULTRASOUND LEFT ICA/CCA RATIO: 1.73
OHS CV CAROTID ULTRASOUND RIGHT ICA/CCA RATIO: 1.64
OHS CV PV CAROTID LEFT HIGHEST CCA: 90
OHS CV PV CAROTID LEFT HIGHEST ICA: 145
OHS CV PV CAROTID RIGHT HIGHEST CCA: 88
OHS CV PV CAROTID RIGHT HIGHEST ICA: 144
OHS CV RV/LV RATIO: 0.78 CM
OHS CV US CAROTID LEFT HIGHEST EDV: 39
PISA AR MAX VEL: 3.49 M/S
PISA MRMAX VEL: 4.03 M/S
PISA TR MAX VEL: 2.1 M/S
PULM VEIN S/D RATIO: 1.95
PV MEAN GRADIENT: 1 MMHG
PV PEAK D VEL: 0.39 M/S
PV PEAK GRADIENT: 3 MMHG
PV PEAK S VEL: 0.76 M/S
PV PEAK VELOCITY: 0.93 M/S
RA MAJOR: 4.23 CM
RA PRESSURE ESTIMATED: 3 MMHG
RA WIDTH: 3.2 CM
RIGHT ARM DIASTOLIC BLOOD PRESSURE: 79 MMHG
RIGHT ARM SYSTOLIC BLOOD PRESSURE: 166 MMHG
RIGHT CBA DIAS: 20 CM/S
RIGHT CBA SYS: 71 CM/S
RIGHT CCA DIST DIAS: 21 CM/S
RIGHT CCA DIST SYS: 88 CM/S
RIGHT CCA MID DIAS: 18 CM/S
RIGHT CCA MID SYS: 80 CM/S
RIGHT CCA PROX DIAS: 14 CM/S
RIGHT CCA PROX SYS: 69 CM/S
RIGHT ECA DIAS: 20 CM/S
RIGHT ECA SYS: 92 CM/S
RIGHT ICA DIST DIAS: 41 CM/S
RIGHT ICA DIST SYS: 144 CM/S
RIGHT ICA MID DIAS: 19 CM/S
RIGHT ICA MID SYS: 70 CM/S
RIGHT ICA PROX DIAS: 24 CM/S
RIGHT ICA PROX SYS: 89 CM/S
RIGHT VENTRICLE DIASTOLIC BASEL DIMENSION: 3.1 CM
RIGHT VERTEBRAL DIAS: 19 CM/S
RIGHT VERTEBRAL SYS: 75 CM/S
RV TB RVSP: 5 MMHG
RV TISSUE DOPPLER FREE WALL SYSTOLIC VELOCITY 1 (APICAL 4 CHAMBER VIEW): 16.2 CM/S
SINUS: 2.5 CM
STJ: 2.23 CM
TDI LATERAL: 0.1 M/S
TDI SEPTAL: 0.07 M/S
TDI: 0.09 M/S
TR MAX PG: 18 MMHG
TRICUSPID ANNULAR PLANE SYSTOLIC EXCURSION: 1.8 CM
TV REST PULMONARY ARTERY PRESSURE: 21 MMHG
Z-SCORE OF LEFT VENTRICULAR DIMENSION IN END DIASTOLE: -0.73
Z-SCORE OF LEFT VENTRICULAR DIMENSION IN END SYSTOLE: -0.22

## 2025-02-03 PROCEDURE — 93880 EXTRACRANIAL BILAT STUDY: CPT

## 2025-02-03 PROCEDURE — 63600175 PHARM REV CODE 636 W HCPCS: Mod: JZ,TB | Performed by: INTERNAL MEDICINE

## 2025-02-03 PROCEDURE — 93356 MYOCRD STRAIN IMG SPCKL TRCK: CPT | Mod: ,,, | Performed by: INTERNAL MEDICINE

## 2025-02-03 PROCEDURE — 93306 TTE W/DOPPLER COMPLETE: CPT | Mod: 26,,, | Performed by: INTERNAL MEDICINE

## 2025-02-03 PROCEDURE — 93880 EXTRACRANIAL BILAT STUDY: CPT | Mod: 26,,, | Performed by: INTERNAL MEDICINE

## 2025-02-03 PROCEDURE — 96372 THER/PROPH/DIAG INJ SC/IM: CPT

## 2025-02-03 PROCEDURE — 93356 MYOCRD STRAIN IMG SPCKL TRCK: CPT

## 2025-02-03 RX ADMIN — ROMOSOZUMAB-AQQG 210 MG: 105 INJECTION, SOLUTION SUBCUTANEOUS at 10:02

## 2025-02-04 ENCOUNTER — TELEPHONE (OUTPATIENT)
Dept: INTERNAL MEDICINE | Facility: CLINIC | Age: 85
End: 2025-02-04
Payer: MEDICARE

## 2025-02-04 ENCOUNTER — TELEPHONE (OUTPATIENT)
Dept: CARDIOLOGY | Facility: CLINIC | Age: 85
End: 2025-02-04
Payer: MEDICARE

## 2025-02-04 NOTE — TELEPHONE ENCOUNTER
----- Message from Kayleigh sent at 2/4/2025 10:25 AM CST -----  Contact: [phone]  Cc: Lorene Beauchamp, DO  Patient called, requested a call back from Dr Beauchamp, not the nurse.  - did not specify.

## 2025-02-04 NOTE — TELEPHONE ENCOUNTER
Left message on voicemail        Pt wants to know how the results of echo and carotid done that were yesterday- compare to the echo and carotid done last year(ordered by Dr. Garsia).    Please advise    ----- Message from Teresita sent at 2/4/2025  1:46 PM CST -----  Type:  Test Results    Who Called:  Pt  Name of Test (Lab/Mammo/Etc):  Angiogram  Date of Test: 2/3/25  Ordering Provider:  Dr. Yun  Where the test was performed:   Would the patient rather a call back or a response via [facility]? Call  Best Call Back Number:  [phone]  Additional Information:  Pt would like to speak with Jarrett in office.

## 2025-02-12 ENCOUNTER — TELEPHONE (OUTPATIENT)
Dept: CARDIOLOGY | Facility: CLINIC | Age: 85
End: 2025-02-12
Payer: MEDICARE

## 2025-02-12 NOTE — TELEPHONE ENCOUNTER
All questions answered.      ----- Message from Bette sent at 2/12/2025  1:41 PM CST -----  Regarding: self    Type: Patient Call Back     Who called:self     What is the request in detail:pt is stating she is on two medications and would like to know what time of day she should take the meds, pt is asking for Ms Farias to call her     Can the clinic reply by [facility]? No     Would the patient rather a call back or a response via [facility]? Call back     Best call back number: [phone]       Additional Information:     Thank you.

## 2025-02-28 ENCOUNTER — TELEPHONE (OUTPATIENT)
Dept: CARDIOLOGY | Facility: CLINIC | Age: 85
End: 2025-02-28
Payer: MEDICARE

## 2025-02-28 NOTE — TELEPHONE ENCOUNTER
Pt asking okay to fly with 30 day event monitor.  Pt advised okay to fly with monitor as long as she can get thru [employer] or she can take it off for the flight and put it on when she gets to her destination.  All questions answered.    ----- Message from Kathleen sent at 2/28/2025  3:23 PM CST -----  Regarding: missed call  Type:  Patient Returning CallWho Called:patientWho Left Message for Patient:SylindaDoes the patient know what this is regarding?:yesWould the patient rather a call back or a response via [facility]? callBe Call Back Number: [phone] Information:

## 2025-02-28 NOTE — TELEPHONE ENCOUNTER
Returned call, no answer, left message on voice mail.      ----- Message from Teresita sent at 2/28/2025  2:42 PM CST -----  Type:  Needs Medical AdviceWho Called: PtWould the patient rather a call back or a response via [facility]? Call[facility]ibeSec Call Back Number:  [phone] Information: Pt would like to speak to provider's nurse in office regarding monitor.  Pt has a question concerning the heart monitor.  Pt states she called previously and has not received a call back.

## 2025-03-06 ENCOUNTER — OFFICE VISIT (OUTPATIENT)
Dept: INTERNAL MEDICINE | Facility: CLINIC | Age: 85
End: 2025-03-06
Payer: MEDICARE

## 2025-03-06 ENCOUNTER — HOSPITAL ENCOUNTER (OUTPATIENT)
Dept: RADIOLOGY | Facility: HOSPITAL | Age: 85
Discharge: HOME | End: 2025-03-06
Payer: MEDICARE

## 2025-03-06 VITALS
HEIGHT: 59 IN | BODY MASS INDEX: 19.12 KG/M2 | OXYGEN SATURATION: 92 % | HEART RATE: 56 BPM | DIASTOLIC BLOOD PRESSURE: 64 MMHG | WEIGHT: 94.81 LBS | SYSTOLIC BLOOD PRESSURE: 122 MMHG

## 2025-03-06 DIAGNOSIS — M81.0 OSTEOPOROSIS, UNSPECIFIED OSTEOPOROSIS TYPE, UNSPECIFIED PATHOLOGICAL FRACTURE PRESENCE: ICD-10-CM

## 2025-03-06 DIAGNOSIS — I10 ESSENTIAL HYPERTENSION: ICD-10-CM

## 2025-03-06 DIAGNOSIS — K59.00 CONSTIPATION, UNSPECIFIED CONSTIPATION TYPE: ICD-10-CM

## 2025-03-06 DIAGNOSIS — E46 PROTEIN-CALORIE MALNUTRITION, UNSPECIFIED SEVERITY: ICD-10-CM

## 2025-03-06 DIAGNOSIS — M54.16 LUMBAR RADICULOPATHY: ICD-10-CM

## 2025-03-06 DIAGNOSIS — M54.6 ACUTE BILATERAL THORACIC BACK PAIN: ICD-10-CM

## 2025-03-06 DIAGNOSIS — M54.6 ACUTE BILATERAL THORACIC BACK PAIN: Primary | ICD-10-CM

## 2025-03-06 DIAGNOSIS — E78.5 HYPERLIPIDEMIA, UNSPECIFIED HYPERLIPIDEMIA TYPE: ICD-10-CM

## 2025-03-06 DIAGNOSIS — R53.83 FATIGUE, UNSPECIFIED TYPE: ICD-10-CM

## 2025-03-06 PROCEDURE — 72100 X-RAY EXAM L-S SPINE 2/3 VWS: CPT | Mod: TC

## 2025-03-06 PROCEDURE — 72100 X-RAY EXAM L-S SPINE 2/3 VWS: CPT | Mod: 26,,, | Performed by: RADIOLOGY

## 2025-03-06 PROCEDURE — 99213 OFFICE O/P EST LOW 20 MIN: CPT | Mod: S$PBB,GC,,

## 2025-03-06 PROCEDURE — 72070 X-RAY EXAM THORAC SPINE 2VWS: CPT | Mod: TC

## 2025-03-06 PROCEDURE — 99999 PR PBB SHADOW E&M-EST. PATIENT-LVL IV: CPT | Mod: PBBFAC,GC,,

## 2025-03-06 PROCEDURE — 99214 OFFICE O/P EST MOD 30 MIN: CPT | Mod: PBBFAC

## 2025-03-06 PROCEDURE — 72070 X-RAY EXAM THORAC SPINE 2VWS: CPT | Mod: 26,,, | Performed by: RADIOLOGY

## 2025-03-06 PROCEDURE — G2211 COMPLEX E/M VISIT ADD ON: HCPCS | Mod: S$PBB,GC,,

## 2025-03-06 NOTE — PROGRESS NOTES
Subjective     Chief Complaint: Follow up     History of Present Illness:  Ms. Kathya Romero is a 84 y.o. female with hx of HTN, stroke, HLD, lumbar radiculopathy, osteoporosis who presented to the clinic for follow up. Main concern is fatigue for the past few months. Reports feeling tired throughout the day. Denies any depression symptoms. Reports not sleeping well. Also reports having bilateral and midline upper back pain. Describes the pain as an dull achy sensation. Worsens with prolonged standing and lifting. Improves with resting and sitting. Has not tried anything for the pain. Denies any numbness or weakness or any radiation. Denies any injuries or falls.    Constipation: Previously had a bowel movement daily but has been going every 3-5 days. Reports drinking 5 glass of fluids daily with poor fiber intake. Stared on Miralax and Senna but reports having cramping with Senna.     Malnutrition: Has had poor intake for past year. Reports having decreased appetite. Typically eat a vegetarian diet. Encouraged to drink Ensure with her meals    Lumbar Radiculopathy: Currently on gabapentin 200 mg TID. States that she skips her morning dose due to drowsiness.     HTN: Currently controlled on Losartan-HCTZ, Amlodipine. Follow with Cardiology    A-fib:  Recently diagnosed with paroxysmal AFib.  Follows with Cardiology.  On metoprolol XL 25 mg and Eliquis.     Hyperlipidemia: Currently on Lipitor 20 mg and Niacin.  Previously on simvastatin 5 mg.    Carotid Artery Stenosis: Reported history of Carotid Artery Stenosis.     Osteoporosis:  Initially diagnosed with osteoporosis in 2014.  DEXA scan from April 2024 showed T-score of -3.5 in the lumbar spine.  Recently followed up with Endocrinology who is recommending Evenity for one year pending approval followed by either Reclast or Prolia.  Currently on Vitamin D3 and Calcium.     Cerebral aneurysm:  Bilateral Cervical artery angiogram from May 2024: The previously  coil embolized right ophthalmic segment aneurysm is excluded from circulation with no evidence of residual or recurrence. There is minimal coil remodeling at its base. There is no evidence of new cerebral aneurysms compared to prior cerebral angiogram performed on 04/09/2019.     Health Maintenance:  Vaccines: Reports having shingles, RSV, flu and COVID booster in the past but does not have documentation.  Breast Cancer Screening: Mammogram for April 2024 negative.     Review of Systems   Constitutional:  Negative for chills and fever.   HENT:  Negative for congestion.    Eyes:  Negative for blurred vision and double vision.   Respiratory:  Negative for cough and shortness of breath.    Cardiovascular:  Negative for chest pain and palpitations.   Gastrointestinal:  Positive for constipation. Negative for abdominal pain, blood in stool, diarrhea, melena, nausea and vomiting.   Genitourinary:  Negative for dysuria.   Musculoskeletal:  Positive for back pain.   Neurological:  Negative for tingling, sensory change, focal weakness, loss of consciousness, weakness and headaches.   All other systems reviewed and are negative.      PAST HISTORY:     Past Medical History:   Diagnosis Date    Cerebral aneurysm     Hemorrhagic stroke     Supraventricular tachycardia        Past Surgical History:   Procedure Laterality Date    CEREBRAL ANEURYSM REPAIR      GALLBLADDER SURGERY  2006    HEMORRHOID SURGERY  1975    HYSTERECTOMY  1984    OOPHORECTOMY  1984    TONSILLECTOMY         Family History   Problem Relation Name Age of Onset    Heart disease Mother      Pancreatic cancer Mother      Heart disease Father         Social History     Socioeconomic History    Marital status:    Tobacco Use    Smoking status: Never    Smokeless tobacco: Never   Substance and Sexual Activity    Alcohol use: Never    Drug use: Never       MEDICATIONS & ALLERGIES:     Current Outpatient Medications on File Prior to Visit   Medication Sig     "amLODIPine (NORVASC) 2.5 MG tablet Take 2 tablets (5 mg total) by mouth once daily.    apixaban (ELIQUIS) 2.5 mg Tab Take 1 tablet (2.5 mg total) by mouth 2 (two) times daily.    atorvastatin (LIPITOR) 20 MG tablet Take 1 tablet (20 mg total) by mouth once daily.    calcium carbonate (OS-TAMMI) 500 mg calcium (1,250 mg) chewable tablet Take 1 tablet by mouth once daily.    cholecalciferol, vitamin D3, (VITAMIN D3) 50 mcg (2,000 unit) Cap capsule Take 2,000 Units by mouth once daily.    cyanocobalamin (VITAMIN B-12) 1000 MCG tablet Take 1,000 mcg by mouth.    fluticasone propionate (FLONASE) 50 mcg/actuation nasal spray 1 spray by Each Nostril route once daily.    gabapentin (NEURONTIN) 100 MG capsule Take 2 capsules (200 mg total) by mouth 2 (two) times daily.    losartan-hydrochlorothiazide 50-12.5 mg (HYZAAR) 50-12.5 mg per tablet Take 1 tablet by mouth once daily.    metoprolol succinate (TOPROL-XL) 25 MG 24 hr tablet Take 1 tablet (25 mg total) by mouth 2 (two) times daily.    romosozumab-aqqg (EVENITY) 210mg/2.34mL ( 105mg/1.17mLx2) Inject 210 mg into the skin every 30 days.     No current facility-administered medications on file prior to visit.       Review of patient's allergies indicates:   Allergen Reactions    Aspirin     Nitrofurantoin Nausea And Vomiting       OBJECTIVE:     Vital Signs:  Vitals:    03/06/25 1020   BP: 122/64   BP Location: Left arm   Patient Position: Sitting   Pulse: (!) 56   SpO2: (!) 92%   Weight: 43 kg (94 lb 12.8 oz)   Height: 4' 11" (1.499 m)           Body mass index is 19.15 kg/m².     Physical Exam  Vitals and nursing note reviewed.   Constitutional:       General: She is not in acute distress.     Appearance: Normal appearance. She is not ill-appearing.   HENT:      Head: Atraumatic.      Right Ear: External ear normal.      Left Ear: External ear normal.   Eyes:      Extraocular Movements: Extraocular movements intact.   Cardiovascular:      Rate and Rhythm: Normal rate and " regular rhythm.      Pulses: Normal pulses.      Heart sounds: Normal heart sounds. No murmur heard.  Pulmonary:      Effort: Pulmonary effort is normal. No respiratory distress.      Breath sounds: Normal breath sounds.   Abdominal:      General: Abdomen is flat. Bowel sounds are normal.      Palpations: Abdomen is soft. There is no mass.      Tenderness: There is no abdominal tenderness. There is no guarding.   Musculoskeletal:         General: Normal range of motion.      Thoracic back: Tenderness present.      Lumbar back: Tenderness present.      Right lower leg: No edema.      Left lower leg: No edema.   Skin:     General: Skin is warm and dry.   Neurological:      General: No focal deficit present.      Mental Status: She is alert and oriented to person, place, and time.   Psychiatric:         Mood and Affect: Mood normal.         Behavior: Behavior normal.         Laboratory  No results found for this or any previous visit (from the past 24 hours).    Diagnostic Results:      Health Maintenance Due   Topic Date Due    Shingles Vaccine (1 of 2) Never done    RSV Vaccine (Age 60+ and Pregnant patients) (1 - 1-dose 75+ series) Never done    Pneumococcal Vaccines (Age 50+) (2 of 2 - PPSV23) 04/01/2017    Influenza Vaccine (1) 09/01/2024    COVID-19 Vaccine (1 - 2024-25 season) Never done         ASSESSMENT & PLAN:   83 y.o. female with hx of HTN, stroke, HLD, lumbar radiculopathy, osteoporosis who presented to the clinic for a follow up. Will check CBC, CMP, B12 for fatigue but suspect this maybe 2/2 to poor sleep. Dicussed the importance of fluid and fiber intake for her constipation. Instructed to continue Ensure TID with meals. Instructed to discontinue senna and continue Miralax daily. Can increase to BID if no improvement. Will check Thoracic and Lumbar XR for back pain.     1. Acute bilateral thoracic back pain  -     X-Ray Lumbar Spine 2 Or 3 Views; Future; Expected date: 03/06/2025  -     X-Ray Thoracic  Spine AP Lateral; Future; Expected date: 03/06/2025    2. Fatigue, unspecified type  -     Vitamin B12; Future; Expected date: 03/06/2025    3. Constipation, unspecified constipation type    4. Lumbar radiculopathy    5. Essential hypertension  -     CBC W/ AUTO DIFFERENTIAL; Future; Expected date: 03/06/2025  -     COMPREHENSIVE METABOLIC PANEL; Future; Expected date: 03/06/2025  -     Magnesium; Future; Expected date: 03/06/2025  -     PHOSPHORUS; Future; Expected date: 03/06/2025    6. Hyperlipidemia, unspecified hyperlipidemia type    7. Osteoporosis, unspecified osteoporosis type, unspecified pathological fracture presence    8. Protein-calorie malnutrition, unspecified severity  -     Vitamin B12; Future; Expected date: 03/06/2025            See above for further detail.    RTC in 6 months    Discussed with Dr Rosenthal -- staff attestation to follow      Lorene Beauchamp DO  Internal Medicine PGY-2  [facility]

## 2025-03-06 NOTE — PATIENT INSTRUCTIONS
- Stop taking Senna  - Continue Miralax daily. If constipation does not improve, increase to twice daily.  - Drink plenty of water  - Continue fiber supplementation  - Continue taking Ensure three times a day if tolerated  - Can try Tylenol or NSAID such as Advil or Aleve for back pain

## 2025-03-07 NOTE — PROGRESS NOTES
I have reviewed the notes, assessments, and/or procedures performed by Dr. Beauchamp, I concur with his documentation of Kathya Romero.  Date of Service: 3/6/2025

## 2025-03-10 ENCOUNTER — CLINICAL SUPPORT (OUTPATIENT)
Dept: CARDIOLOGY | Facility: HOSPITAL | Age: 85
End: 2025-03-10
Attending: INTERNAL MEDICINE
Payer: MEDICARE

## 2025-03-10 DIAGNOSIS — I49.1 ATRIAL PREMATURE BEATS: ICD-10-CM

## 2025-03-10 DIAGNOSIS — I48.0 PAROXYSMAL ATRIAL FIBRILLATION: ICD-10-CM

## 2025-03-10 PROCEDURE — 93271 ECG/MONITORING AND ANALYSIS: CPT

## 2025-03-12 ENCOUNTER — TELEPHONE (OUTPATIENT)
Dept: INTERNAL MEDICINE | Facility: CLINIC | Age: 85
End: 2025-03-12
Payer: MEDICARE

## 2025-03-12 NOTE — TELEPHONE ENCOUNTER
----- Message from Veronica sent at 3/12/2025  1:35 PM CDT -----  2TESTRESULTSType: Test ResultsWhat test was performed? labsWho ordered the test? Yoanna Beauchamp and where were the test performed? 03/06Would you like a call back and or thru [facility]: call Comments:

## 2025-03-13 ENCOUNTER — INFUSION (OUTPATIENT)
Dept: INFECTIOUS DISEASES | Facility: HOSPITAL | Age: 85
End: 2025-03-13
Attending: INTERNAL MEDICINE
Payer: MEDICARE

## 2025-03-13 VITALS
HEART RATE: 62 BPM | RESPIRATION RATE: 18 BRPM | SYSTOLIC BLOOD PRESSURE: 149 MMHG | OXYGEN SATURATION: 99 % | BODY MASS INDEX: 18.83 KG/M2 | WEIGHT: 93.38 LBS | DIASTOLIC BLOOD PRESSURE: 68 MMHG | TEMPERATURE: 98 F | HEIGHT: 59 IN

## 2025-03-13 DIAGNOSIS — M81.0 AGE-RELATED OSTEOPOROSIS WITHOUT CURRENT PATHOLOGICAL FRACTURE: Primary | ICD-10-CM

## 2025-03-13 PROCEDURE — 63600175 PHARM REV CODE 636 W HCPCS: Mod: JZ,TB | Performed by: INTERNAL MEDICINE

## 2025-03-13 PROCEDURE — 96372 THER/PROPH/DIAG INJ SC/IM: CPT

## 2025-03-13 RX ADMIN — ROMOSOZUMAB-AQQG 210 MG: 105 INJECTION, SOLUTION SUBCUTANEOUS at 10:03

## 2025-03-15 ENCOUNTER — RESULTS FOLLOW-UP (OUTPATIENT)
Dept: INTERNAL MEDICINE | Facility: CLINIC | Age: 85
End: 2025-03-15

## 2025-03-15 ENCOUNTER — TELEPHONE (OUTPATIENT)
Dept: INTERNAL MEDICINE | Facility: CLINIC | Age: 85
End: 2025-03-15
Payer: MEDICARE

## 2025-03-15 NOTE — TELEPHONE ENCOUNTER
Called patient to discuss results.  CBC, CMP, lumbar and thoracic x-rays WNL.  Instructed patient to discontinue vitamin B12 supplements.  Patient requesting a refill for Flonase.  Refill sent  Patient informs that she continues to feel fatigued.  Instructed patient to hold gabapentin for a few days and see if that helps to improve her fatigue.  Patient also requesting disability placard due to trouble walking secondary from her stroke. Will fill out forms to give the patient for a vehicle disability placard.    Lorene Beauchamp DO  Internal Medicine PGY-2  [facility]

## 2025-03-17 ENCOUNTER — TELEPHONE (OUTPATIENT)
Dept: INTERNAL MEDICINE | Facility: CLINIC | Age: 85
End: 2025-03-17
Payer: MEDICARE

## 2025-03-17 NOTE — TELEPHONE ENCOUNTER
Copied from CRM #[id number]. Topic: General Inquiry - Patient Advice  >> Mar 14, 2025  1:30 PM Eleonora wrote:  Patient would like to get medical advice.  Symptoms (please be specific):   Pt is following up on a message left on 03/12/2025 for Dr Nito Badillo about her lab and xray results from 03/06/2025. Pt states she will be going out of town for a month.   How long have you had these symptoms: N/A  Would you like a call back, or a response through your [facility] portal?:  call back [phone]  Pharmacy name and phone # (copy from chart):   N/A  Comments:

## 2025-03-20 ENCOUNTER — TELEPHONE (OUTPATIENT)
Dept: INTERNAL MEDICINE | Facility: CLINIC | Age: 85
End: 2025-03-20
Payer: MEDICARE

## 2025-03-20 ENCOUNTER — TELEPHONE (OUTPATIENT)
Dept: CARDIOLOGY | Facility: CLINIC | Age: 85
End: 2025-03-20
Payer: MEDICARE

## 2025-03-20 NOTE — TELEPHONE ENCOUNTER
Pt advised to wear monitor for 2 wks then remove.    ----- Message from Teresa sent at 3/20/2025 10:00 AM CDT -----  Contact: Patient  Type:  Patient CallWho Called:Patient Does the patient know what this is regarding?: Requesting a call back about her heart monitor  ; pt would like to know if she's to wear the monitor 15 days or 30 days ; please advise Would the patient rather a call back or a response via [facility]?callXinrong Call Back Number: [phone] Additional Information:

## 2025-03-20 NOTE — TELEPHONE ENCOUNTER
----- Message from Terrie sent at 3/20/2025  9:27 AM CDT -----  Contact: patient @ [phone]  .[email or web address] Patient is calling for Medical Advice regarding: gabapentin (NEURONTIN) 100 MG capsulePatient wants a call back or thru [facility]:Call Comments: patient wants to know if she should still take the gabapentin and wants Dr. Beauchamp to call Please advise patient replies from provider may take up to 48 hours.

## 2025-03-26 ENCOUNTER — TELEPHONE (OUTPATIENT)
Dept: INTERNAL MEDICINE | Facility: CLINIC | Age: 85
End: 2025-03-26
Payer: MEDICARE

## 2025-03-26 NOTE — TELEPHONE ENCOUNTER
----- Message from Andra sent at 3/26/2025  8:09 AM CDT -----  Contact: Self/ [phone]  Type: Rx Clarification/ Additional Information/ QuestionsMedication:gabapentin (NEURONTIN) 100 MG capsuleWhat questions do you have about the medication, if any?What information is needed? What the doctor needs her to do Pharmacy number:NAPharmacy Contact Name:NAComments: pt said that she is calling in regards to needing to check the status of a message she left last week asking  if Dr Beauchamp wants her to stay off of the gabapentin (NEURONTIN) 100 MG capsule, or restart the medication and just cut back on the dosage . Please advise

## 2025-03-27 ENCOUNTER — TELEPHONE (OUTPATIENT)
Dept: HEPATOLOGY | Facility: HOSPITAL | Age: 85
End: 2025-03-27
Payer: MEDICARE

## 2025-03-27 RX ORDER — FLUTICASONE PROPIONATE 50 MCG
1 SPRAY, SUSPENSION (ML) NASAL DAILY
Qty: 18.2 ML | Refills: 6 | Status: SHIPPED | OUTPATIENT
Start: 2025-03-27 | End: 2026-03-22

## 2025-03-27 RX ORDER — GABAPENTIN 100 MG/1
100 CAPSULE ORAL 3 TIMES DAILY PRN
Start: 2025-03-27 | End: 2026-03-27

## 2025-03-27 NOTE — TELEPHONE ENCOUNTER
Returned patient's call. Patient reports improvement of fatigue after stopping the gabapentin for a few days but continues to have pain. Instructed patient to take gabapentin 100 mg TID PRN for pain. Patient also reports that she needs a 90 day supply for Flonase. Explained to her that the largest size of Flonase will only cover her for 60 days. Will resend 60 day prescription for Flonase.    Lorene Beauchamp DO  Internal Medicine PGY-2  [facility]

## 2025-03-28 ENCOUNTER — RESULTS FOLLOW-UP (OUTPATIENT)
Dept: CARDIOLOGY | Facility: OTHER | Age: 85
End: 2025-03-28

## 2025-04-08 ENCOUNTER — TELEPHONE (OUTPATIENT)
Dept: CARDIOLOGY | Facility: CLINIC | Age: 85
End: 2025-04-08
Payer: MEDICARE

## 2025-04-08 NOTE — TELEPHONE ENCOUNTER
Left message on voice mail      ----- Message from Hellen Yun MD sent at 4/7/2025  4:26 PM CDT -----  Regarding: RE: appt concerns and medication request  If her pressure is fine she may move the appointment to 6/2025.Hellen Yun M.D.  ----- Message -----  From: Jarrett Washington MA  Sent: 4/7/2025   4:22 PM CDT  To: Hellen Yun MD  Subject: FW: appt concerns and medication request         Pt states her holter results were okay.  Does she need to keep her appointment on 4/29 or can she move it back?  Please advise.  ----- Message -----  From: Tyesha Paiz  Sent: 4/7/2025   1:34 PM CDT  To: [facility] Macedo Staff  Subject: appt concerns and medication request             Name of Who is Calling: Kathya What is the request in detail: Patient is requesting a call back to find out do she still need to come in on 4/29 since her results were ok and to find out if the office have samples of Eliquis. Can the clinic reply by [facility]: No What Number to Call Back if not [facility]:  [phone]

## 2025-04-21 DIAGNOSIS — I48.0 PAROXYSMAL ATRIAL FIBRILLATION: ICD-10-CM

## 2025-04-21 DIAGNOSIS — Z79.01 CHRONIC ANTICOAGULATION: ICD-10-CM

## 2025-04-21 NOTE — TELEPHONE ENCOUNTER
Pt is asking since her event monitor was okay. Does she need to continue taking Apixaban?     Please advise.

## 2025-04-21 NOTE — TELEPHONE ENCOUNTER
Rx eliquis sent to [facility] per pt request to check the cost.    ----- Message from Med Assistant Torres sent at 4/17/2025  3:32 PM CDT -----  Please call patient.  ----- Message -----  From: Gabby Olvera  Sent: 4/17/2025   3:05 PM CDT  To: [facility] Macedo Staff    Type: RX Refill RequestWho called: Patient Refill or New Rx: RefillRx name and Strength: apixaban (ELIQUIS) 2.5 mg TabNeeding prescription sent to a different pharmacy because its expensive ; also requesting a 90 supply ; is there a cheaper generic available that can be covered by insurance ; is it possible if medication can be discontinued since issue is now resolved ; please advise Would the patient rather a call back or a response via [facility]? [facility] call back number: [phone] information:[facility] [address] [place] [address] - [address] [place] [address]Zmyoj: [phone] Fax: [phone]

## 2025-04-21 NOTE — TELEPHONE ENCOUNTER
----- Message from Med Assistant Torres sent at 4/17/2025  3:32 PM CDT -----  Please call patient.  ----- Message -----  From: Gabby Olvera  Sent: 4/17/2025   3:05 PM CDT  To: Jama Macedo Staff    Type: RX Refill RequestWho called: Patient Refill or New Rx: RefillRx name and Strength: apixaban (ELIQUIS) 2.5 mg TabNeeding prescription sent to a different pharmacy because its expensive ; also requesting a 90 supply ; is there a cheaper generic available that can be covered by insurance ; is it possible if medication can be discontinued since issue is now resolved ; please advise Would the patient rather a call back or a response via [facility]? [facility] call back number: [phone] information:[facility] [address] [place] [address] [address] [place] [id number]: [phone] Fax: [phone]

## 2025-04-22 DIAGNOSIS — I48.91 ATRIAL FIBRILLATION, UNSPECIFIED TYPE: Primary | ICD-10-CM

## 2025-04-22 NOTE — TELEPHONE ENCOUNTER
Left message on voice mail-rx Xarelto 10  mg #90 sent to pharmacy to compare pricing.      Pt states Eliquis too expensive cost $641. For a 90 day supply.  Pt wants to see if Xarelto is cheaper.      Will you prescribe Xarelto?    Please advise.      ----- Message from Teresa sent at 4/22/2025  8:29 AM CDT -----  Contact: Patient  Type:  Patient CallWho Called:Patient Does the patient know what this is regarding?: Requesting a call back to discuss medication ; please advise Would the patient rather a call back or a response via [facility] Call Back Number: [phone] Additional Information:

## 2025-04-25 ENCOUNTER — TELEPHONE (OUTPATIENT)
Dept: CARDIOLOGY | Facility: CLINIC | Age: 85
End: 2025-04-25
Payer: MEDICARE

## 2025-04-25 NOTE — TELEPHONE ENCOUNTER
Pt to patient- advised 90 supply sent to pharmacy.      ----- Message from Teresa sent at 4/25/2025  8:34 AM CDT -----  Type:  Patient CallWho Called: patient Does the patient know what this is regarding?: Requesting a call back about having a 90 day refill on Rx rivaroxaban (XARELTO) 10 mg Tab ; 90 day supply ; please advise Would the patient rather a call back or a response via [facility] Call Back Number: [phone] Additional Information: [facility] [address] [place] [address] [address] [address]Ucvxt: [phone] Fax: [phone]

## 2025-04-28 DIAGNOSIS — I48.91 ATRIAL FIBRILLATION, UNSPECIFIED TYPE: ICD-10-CM

## 2025-04-28 NOTE — TELEPHONE ENCOUNTER
Pt requests Xarelto rx to be sent to [facility] to check pricing. Rx sent.----- Message from Teresita sent at 4/28/2025  8:29 AM CDT -----  Type:  Needs Medical AdviceWho Called: PtSymptoms (please be specific): prescription was not received by pharmacyWould the patient rather a call back or a response via [facility]? [facility] Call Back Number:  [phone] Information: Pt would like to like to speak with Kaitlinkathy in office regarding her medication request.

## 2025-04-28 NOTE — TELEPHONE ENCOUNTER
----- Message from Teresita sent at 4/28/2025  8:29 AM CDT -----  Type:  Needs Medical AdviceWho Called: PtSymptoms (please be specific): prescription was not received by pharmacyWould the patient rather a call back or a response via [facility]? [facility] Call Back Number:  [phone] Information: Pt would like to like to speak with Jarrett in office regarding her medication request.

## 2025-05-02 ENCOUNTER — INFUSION (OUTPATIENT)
Dept: INFECTIOUS DISEASES | Facility: HOSPITAL | Age: 85
End: 2025-05-02
Attending: INTERNAL MEDICINE
Payer: MEDICARE

## 2025-05-02 VITALS
BODY MASS INDEX: 18.51 KG/M2 | OXYGEN SATURATION: 95 % | DIASTOLIC BLOOD PRESSURE: 65 MMHG | SYSTOLIC BLOOD PRESSURE: 143 MMHG | HEART RATE: 73 BPM | TEMPERATURE: 98 F | HEIGHT: 59 IN | RESPIRATION RATE: 19 BRPM | WEIGHT: 91.81 LBS

## 2025-05-02 DIAGNOSIS — M81.0 AGE-RELATED OSTEOPOROSIS WITHOUT CURRENT PATHOLOGICAL FRACTURE: Primary | ICD-10-CM

## 2025-05-02 PROCEDURE — 96372 THER/PROPH/DIAG INJ SC/IM: CPT

## 2025-05-02 NOTE — PROGRESS NOTES
Patient arrives for Evenity (2 separate injections) injection - confirms use of calcium and vitamin D supplements and denies dental procedures over past 3 months - administered per guidelines      Limited head-to-toe assessment due to privacy issues and visit reason though the opportunity was given for patient to express any concerns    Next two appts scheduled, pt made aware. Provided AVS.

## 2025-05-06 DIAGNOSIS — Z79.01 CHRONIC ANTICOAGULATION: ICD-10-CM

## 2025-05-06 DIAGNOSIS — I48.0 PAROXYSMAL ATRIAL FIBRILLATION: ICD-10-CM

## 2025-05-06 NOTE — TELEPHONE ENCOUNTER
----- Message from Teresita sent at 5/6/2025  4:33 PM CDT -----  Type:  RX Refill RequestWho Called:  PtRefill or New Rx: RefillRX Name and Strength: apixaban (ELIQUIS) 2.5 mg TabHow is the patient currently taking it? (ex. 1XDay):Is this a 30 day or 90 day RX: 90 day supplyPreferred Pharmacy with phone number: [facility] [address] or [address] Order: LocalOrdering Provider: Dr. Hackettould the patient rather a call back or a response via [facility]? [facility] Call Back Number:  [phone] Information: Pt would like to speak with nurse in office regarding refill of medication   Anita Galvan MD; Consulting Provider: Roberto Barnes [facility] [address] (Cricket Castro     Controlled, benign                    HPI:  [id number], HDL, LDL, LDLC, DLDLP, [id number], 

## 2025-05-07 ENCOUNTER — TELEPHONE (OUTPATIENT)
Dept: INTERNAL MEDICINE | Facility: CLINIC | Age: 85
End: 2025-05-07
Payer: MEDICARE

## 2025-05-07 NOTE — TELEPHONE ENCOUNTER
Was seen in urgent care   For uti , was given medication a  After culture came back antibiotics changed   To injections     Will see Dr Ackerman on 05/22  Is requesting to speak with Dr Beauchamp

## 2025-05-07 NOTE — TELEPHONE ENCOUNTER
----- Message from Kayleigh sent at 5/7/2025  8:24 AM CDT -----  Contact: [phone]  Cc: Lorene Beauchamp, DOSymptom: Urination PainOutcome: Schedule a same-day appointment or talk to a nurse or provider within 1 hour.Reason: Caller denied all higher acuity questionsThe caller accepted this outcome.Patient started with the symptoms 10 days ago, stated that she was seen at an outside clinic was given Rx, but is not working. Would like to see only Dr Beauchamp. Please advise. Thank you.

## 2025-05-07 NOTE — TELEPHONE ENCOUNTER
----- Message from Josefina sent at 5/7/2025  1:36 PM CDT -----  Contact: [phone]@patient  Patient is returning a phone call. Yes Who left a message for the patient: Kaley Wilkinson LPNDoes patient know what this is regarding:  Would you like a call back, or a response through your [facility] portal?:   Call back Comments:

## 2025-06-02 DIAGNOSIS — I10 ESSENTIAL HYPERTENSION: ICD-10-CM

## 2025-06-02 RX ORDER — AMLODIPINE BESYLATE 2.5 MG/1
5 TABLET ORAL DAILY
Qty: 90 TABLET | Refills: 3 | Status: SHIPPED | OUTPATIENT
Start: 2025-06-02 | End: 2025-06-03

## 2025-06-03 DIAGNOSIS — I10 ESSENTIAL HYPERTENSION: ICD-10-CM

## 2025-06-03 RX ORDER — FLUTICASONE PROPIONATE 50 MCG
1 SPRAY, SUSPENSION (ML) NASAL DAILY
Qty: 18.2 ML | Refills: 6 | Status: SHIPPED | OUTPATIENT
Start: 2025-06-03 | End: 2026-05-29

## 2025-06-03 RX ORDER — AMLODIPINE BESYLATE 2.5 MG/1
2.5 TABLET ORAL DAILY
Qty: 90 TABLET | Refills: 3 | Status: SHIPPED | OUTPATIENT
Start: 2025-06-03

## 2025-06-06 ENCOUNTER — INFUSION (OUTPATIENT)
Dept: INFECTIOUS DISEASES | Facility: HOSPITAL | Age: 85
End: 2025-06-06
Attending: INTERNAL MEDICINE
Payer: MEDICARE

## 2025-06-06 VITALS
DIASTOLIC BLOOD PRESSURE: 75 MMHG | SYSTOLIC BLOOD PRESSURE: 116 MMHG | WEIGHT: 93.38 LBS | TEMPERATURE: 98 F | OXYGEN SATURATION: 100 % | RESPIRATION RATE: 19 BRPM | HEIGHT: 59 IN | BODY MASS INDEX: 18.83 KG/M2 | HEART RATE: 62 BPM

## 2025-06-06 DIAGNOSIS — M81.0 AGE-RELATED OSTEOPOROSIS WITHOUT CURRENT PATHOLOGICAL FRACTURE: Primary | ICD-10-CM

## 2025-06-06 PROCEDURE — 96372 THER/PROPH/DIAG INJ SC/IM: CPT

## 2025-06-09 ENCOUNTER — PATIENT OUTREACH (OUTPATIENT)
Facility: OTHER | Age: 85
End: 2025-06-09
Payer: MEDICARE

## 2025-06-09 ENCOUNTER — OFFICE VISIT (OUTPATIENT)
Dept: INTERNAL MEDICINE | Facility: CLINIC | Age: 85
End: 2025-06-09
Payer: MEDICARE

## 2025-06-09 ENCOUNTER — NURSE TRIAGE (OUTPATIENT)
Dept: ADMINISTRATIVE | Facility: CLINIC | Age: 85
End: 2025-06-09
Payer: MEDICARE

## 2025-06-09 ENCOUNTER — OCHSNER VIRTUAL EMERGENCY DEPARTMENT (OUTPATIENT)
Facility: CLINIC | Age: 85
End: 2025-06-09
Payer: MEDICARE

## 2025-06-09 VITALS
HEIGHT: 59 IN | BODY MASS INDEX: 18.89 KG/M2 | OXYGEN SATURATION: 100 % | WEIGHT: 93.69 LBS | HEART RATE: 68 BPM | DIASTOLIC BLOOD PRESSURE: 66 MMHG | SYSTOLIC BLOOD PRESSURE: 110 MMHG

## 2025-06-09 DIAGNOSIS — M54.30 SCIATICA, UNSPECIFIED LATERALITY: Primary | ICD-10-CM

## 2025-06-09 DIAGNOSIS — N30.00 ACUTE CYSTITIS WITHOUT HEMATURIA: ICD-10-CM

## 2025-06-09 LAB — HOLD SPECIMEN: NORMAL

## 2025-06-09 PROCEDURE — 87088 URINE BACTERIA CULTURE: CPT

## 2025-06-09 PROCEDURE — 99213 OFFICE O/P EST LOW 20 MIN: CPT | Mod: S$PBB,,, | Performed by: INTERNAL MEDICINE

## 2025-06-09 PROCEDURE — 81003 URINALYSIS AUTO W/O SCOPE: CPT

## 2025-06-09 PROCEDURE — 99214 OFFICE O/P EST MOD 30 MIN: CPT | Mod: PBBFAC

## 2025-06-09 PROCEDURE — 99999 PR PBB SHADOW E&M-EST. PATIENT-LVL IV: CPT | Mod: PBBFAC,GC,,

## 2025-06-09 RX ORDER — BACLOFEN 5 MG/1
10 TABLET ORAL NIGHTLY
Qty: 30 TABLET | Refills: 0 | Status: SHIPPED | OUTPATIENT
Start: 2025-06-09 | End: 2025-07-09

## 2025-06-09 NOTE — TELEPHONE ENCOUNTER
"Pt c/o upper back pain when standing for extended periods for the last week. Pain radiates down to her lower back and down to both legs. She has hx of sciatica on one leg. She takes gabapentin for the sciatica but this is new pain in both legs. No recent fall or injury. She fell a month ago when she fell and she was seen at the clinic that day for a dog bite also. Back pain is currently 7/10 and low back to both legs are 9/10.  She also had a UTI a few weeks ago and is having continued painful urination. She denies blood in her urine, abdominal pain, vomiting, fever, numbness/weakness to one side of her body. Recommended dispo is to see in office today or VV. VV declined. Pt requests appt for tomorrow as she is unable to go today. Checking for appt now. Contacted Dr Raines with [facility]. "Agree she needs PCP appointment. Today seems more appropriate but if she declines today then tomorrow is second best"  Pt has to get transportation worked out so appt scheduled for tomorrow morning and I provided the central scheduling number if she is able to reschedule for today. Advised pt to call back for any new or worsening symptoms and she VU.               Reason for Disposition   Pain or burning with passing urine (urination)    Additional Information   Negative: Passed out (e.g., fainted, lost consciousness, blacked out and was not responding)   Negative: Shock suspected (e.g., cold/pale/clammy skin, too weak to stand, low BP, rapid pulse)   Negative: Sounds like a life-threatening emergency to the triager   Negative: SEVERE back pain of sudden onset and age > 60 years   Negative: SEVERE abdominal pain (e.g., excruciating)   Negative: Abdominal pain and age > 60 years   Negative: Unable to urinate (or only a few drops) and bladder feels very full   Negative: Loss of bladder or bowel control (urine or bowel incontinence; wetting self, leaking stool) of new-onset   Negative: Numbness (loss of sensation) in groin or rectal " area   Negative: Pain radiates into groin, scrotum   Negative: Blood in urine (red, pink, or tea-colored)   Negative: Vomiting and pain over lower ribs of back (i.e., flank - kidney area)   Negative: Weakness of a leg or foot (e.g., unable to bear weight, dragging foot)   Negative: Patient sounds very sick or weak to the triager   Negative: Fever > 100.4 F (38.0 C) and flank pain    Protocols used: Back Pain-A-[place]

## 2025-06-09 NOTE — PLAN OF CARE-OVED
[facility] Emergency Department Plan of Care Note  Referral Source: Nurse On-Call                               Chief Complaint   Patient presents with    Back Pain     Pt w self reported hx of sciatica c/o upper back pain when standing for the last week. Pain radiates down to her lower back and down to both legs. She takes gabapentin for the sciatica. No recent fall or injury.   She also reports she had a UTI a few weeks ago and is having continued painful urination. She denies blood in her urine, abdominal pain, vomiting, fever, numbness/weakness to one side of her body. Pt requests appt for tomorrow as she is unable to go today.     Dysuria       Recommendation: Primary Care                       Recommendation comment: Recommend PCP appointment today    No diagnosis found.

## 2025-06-09 NOTE — PROGRESS NOTES
"Patient contacted [facility] on 6/9/25 with c/o "upper back pain when standing for extended periods for the last week. Pain radiates down to her lower back and down to both legs. She has hx of sciatica on one leg. She takes gabapentin for the sciatica but this is new pain in both legs. No recent fall or injury. She fell a month ago when she fell and she was seen at the clinic that day for a dog bite also. Back pain is currently 7/10 and low back to both legs are 9/10. She also had a UTI a few weeks ago and is having continued painful urination. She denies blood in her urine, abdominal pain, vomiting, fever, numbness/weakness to one side of her body."  RN consulted with [facility] provider Shaquille Raines MD was consulted, and disposition was patient to go to PCP.  Appointment scheduled 6/9/25 at 1:00 with Carin Locke MD [facility].    Follow up scheduled 6/11/25 to assess for additional needs or concerns following PCP visit.    "

## 2025-06-09 NOTE — PROGRESS NOTES
Kathya Romero  1940        Subjective     Reason for Visit:    History of Present Illness:  Ms. Kathya Romero is a 84 y.o. female with a history of HTN, stroke, HLD, lumbar radiculopathy, osteoporosis who presented to the clinic for upper back pain which radiates down to her lower back and down to both legs. She has hx of sciatica of left leg.     For the past 10 days, patient is complaining of pain in the mid thoracic back, radiating to buttocks and radiating to both legs. She reports this as a cramping pain in the right leg. This with her chronic sciatica, she describes the pain as 7-8/10 pain. She notices it first thing in the morning, and causes difficulty with ambulation along with going up and down the stairs.  First thing in the morning, and going up and down the stairs. Given her long standing history of sciatica, she has done multiple rounds of PT. She denies any red flag sxs like saddle anesthesia, or urinary or fecal incontinence.     She is also c/o pain with urination, similar to prior UTI sxs. Patient reports that prior Ucx positive for Ecoli, and was treated with cephalex for 7 days.     Review of Systems   Genitourinary:  Positive for dysuria. Negative for frequency and urgency.   Musculoskeletal:  Positive for back pain.        PAST HISTORY:     Past Medical History:   Diagnosis Date    Cerebral aneurysm     Hemorrhagic stroke     Supraventricular tachycardia        Past Surgical History:   Procedure Laterality Date    CEREBRAL ANEURYSM REPAIR      GALLBLADDER SURGERY  2006    HEMORRHOID SURGERY  1975    HYSTERECTOMY  1984    OOPHORECTOMY  1984    TONSILLECTOMY         Family History   Problem Relation Name Age of Onset    Heart disease Mother      Pancreatic cancer Mother      Heart disease Father         Social History     Socioeconomic History    Marital status:    Tobacco Use    Smoking status: Never    Smokeless tobacco: Never   Substance and Sexual Activity    Alcohol use:  "Never    Drug use: Never       MEDICATIONS & ALLERGIES:     Current Outpatient Medications on File Prior to Visit   Medication Sig    amLODIPine (NORVASC) 2.5 MG tablet Take 1 tablet (2.5 mg total) by mouth once daily.    apixaban (ELIQUIS) 2.5 mg Tab Take 1 tablet (2.5 mg total) by mouth 2 (two) times daily.    atorvastatin (LIPITOR) 20 MG tablet Take 1 tablet (20 mg total) by mouth once daily.    calcium carbonate (OS-TAMMI) 500 mg calcium (1,250 mg) chewable tablet Take 1 tablet by mouth once daily.    cholecalciferol, vitamin D3, (VITAMIN D3) 50 mcg (2,000 unit) Cap capsule Take 2,000 Units by mouth once daily.    cyanocobalamin (VITAMIN B-12) 1000 MCG tablet Take 1,000 mcg by mouth.    fluticasone propionate (FLONASE) 50 mcg/actuation nasal spray 1 spray (50 mcg total) by Each Nostril route once daily.    gabapentin (NEURONTIN) 100 MG capsule Take 1 capsule (100 mg total) by mouth 3 (three) times daily as needed (pain).    losartan-hydrochlorothiazide 50-12.5 mg (HYZAAR) 50-12.5 mg per tablet Take 1 tablet by mouth once daily.    metoprolol succinate (TOPROL-XL) 25 MG 24 hr tablet Take 1 tablet (25 mg total) by mouth 2 (two) times daily.    romosozumab-aqqg (EVENITY) 210mg/2.34mL ( 105mg/1.17mLx2) Inject 210 mg into the skin every 30 days.     No current facility-administered medications on file prior to visit.       Review of patient's allergies indicates:   Allergen Reactions    Aspirin Nausea And Vomiting    Nitrofurantoin Nausea And Vomiting       OBJECTIVE:        Vital Signs:  Vitals:    06/09/25 1311   BP: 110/66   BP Location: Right arm   Patient Position: Sitting   Pulse: 68   SpO2: 100%   Weight: 42.5 kg (93 lb 11.1 oz)   Height: 4' 11" (1.499 m)       Body mass index is 18.92 kg/m².     Physical Exam:  Physical Exam  Constitutional:       General: She is not in acute distress.     Appearance: She is not ill-appearing.   Cardiovascular:      Rate and Rhythm: Normal rate.      Pulses: Normal pulses.      " "Heart sounds: No murmur heard.  Pulmonary:      Effort: Pulmonary effort is normal. No respiratory distress.      Breath sounds: Normal breath sounds.   Musculoskeletal:      Comments: Positive bilateral straight leg test   Skin:     General: Skin is warm.   Neurological:      Mental Status: She is alert and oriented to person, place, and time.   Psychiatric:         Mood and Affect: Mood normal.        Laboratory  Lab Results   Component Value Date    WBC 9.86 03/06/2025    HGB 13.2 03/06/2025    HCT 40.6 03/06/2025    MCV 92 03/06/2025     03/06/2025     Lab Results   Component Value Date    GLU 80 03/06/2025     (L) 03/06/2025    K 3.8 03/06/2025    CL 99 03/06/2025    CO2 24 03/06/2025    BUN 16 03/06/2025    CREATININE 0.7 03/06/2025    CALCIUM 9.8 03/06/2025    MG 1.9 03/06/2025    PROT 7.5 03/06/2025    BILITOT 0.5 03/06/2025    ALKPHOS 88 03/06/2025    ALT 32 03/06/2025    AST 44 (H) 03/06/2025     No results found for: "INR", "PROTIME"  Lab Results   Component Value Date    CHOL 168 04/19/2024    HDL 73 04/19/2024    LDLCALC 84.2 04/19/2024    TRIG 54 04/19/2024     Lab Results   Component Value Date    HGBA1C 5.3 03/21/2024     Lab Results   Component Value Date    TSH 1.378 10/07/2024     No results for input(s): "POCTGLUCOSE" in the last 72 hours.       Health Maintenance         Date Due Completion Date    Shingles Vaccine (1 of 2) Never done ---    RSV Vaccine (Age 60+ and Pregnant patients) (1 - 1-dose 75+ series) Never done ---    Pneumococcal Vaccines (Age 50+) (2 of 2 - PPSV23) 01/12/2023 1/12/2022    COVID-19 Vaccine (1 - 2024-25 season) Never done ---    Influenza Vaccine (Season Ended) 09/01/2025 10/1/2023 (Done)    Override on 10/1/2023: Done    DEXA Scan 04/19/2026 4/19/2024    TETANUS VACCINE 09/13/2028 9/13/2018    Lipid Panel 04/19/2029 4/19/2024                ASSESSMENT & PLAN:       Ms. marii Romero is a 84 y.o. female who was seen today in clinic for worsening of her " sciatica, as it is now bilateral. Given positive straight leg test, and patient describing pain with ambulation and daily living, will initiate trial of baclofen with her already rx gabapentin, and additionally refer her to pain medicine to see if patient would benefit for steroid injection. Additionally, patient has done PT in the past, I think patient would benefit from PT.  Patient also c/o early stages of UTI with dysuria, will obtain UA and based off sensitivities will rx abx. She had a prior hx of recurrent UTI and had seen urology in the past and has issues with urinary incontinence, and this is now her second since early May, will refer to urology.     All questions answered. In office handout given.    Kathya was seen today for back pain.    Diagnoses and all orders for this visit:    Sciatica, unspecified laterality  -     Ambulatory referral/consult to [facility]; Future  -     Ambulatory referral/consult to Pain Clinic; Future  -     baclofen (LIORESAL) 5 mg Tab tablet; Take 2 tablets (10 mg total) by mouth every evening.    Acute cystitis without hematuria  -     Urinalysis, Reflex to Urine Culture Urine, Clean Catch  -     Ambulatory referral/consult to Urology; Future  -     GREY TOP URINE HOLD  -     Urinalysis Microscopic  -     Urine culture         1. Sciatica, unspecified laterality    2. Acute cystitis without hematuria        No follow-ups on file.    Other Orders Placed This Visit:  Orders Placed This Encounter   Procedures    Urine culture    Urinalysis, Reflex to Urine Culture Urine, Clean Catch    GREY TOP URINE HOLD    Urinalysis Microscopic    Ambulatory referral/consult to [facility]    Ambulatory referral/consult to Pain Clinic    Ambulatory referral/consult to Urology         Discussed with Dr. Crum - staff attestation to follow    Carin Locke MD  Internal Medicine, PGY-3  06/12/2025.1:15 PM  [facility]  Internal Medicine  Resident Clinic  [address] [place] [address]  [phone]  [email or web address]

## 2025-06-10 ENCOUNTER — TELEPHONE (OUTPATIENT)
Dept: INTERNAL MEDICINE | Facility: CLINIC | Age: 85
End: 2025-06-10
Payer: MEDICARE

## 2025-06-10 LAB
BACTERIA #/AREA URNS AUTO: ABNORMAL /HPF
BILIRUB UR QL STRIP.AUTO: NEGATIVE
CLARITY UR: ABNORMAL
COLOR UR AUTO: YELLOW
GLUCOSE UR QL STRIP: NEGATIVE
HGB UR QL STRIP: NEGATIVE
KETONES UR QL STRIP: NEGATIVE
LEUKOCYTE ESTERASE UR QL STRIP: ABNORMAL
MICROSCOPIC COMMENT: ABNORMAL
NITRITE UR QL STRIP: NEGATIVE
PH UR STRIP: 7 [PH]
PROT UR QL STRIP: NEGATIVE
RBC #/AREA URNS AUTO: 1 /HPF (ref 0–4)
SP GR UR STRIP: 1.01
SQUAMOUS #/AREA URNS AUTO: 13 /HPF
UROBILINOGEN UR STRIP-ACNC: NEGATIVE EU/DL
WBC #/AREA URNS AUTO: 30 /HPF (ref 0–5)

## 2025-06-10 NOTE — TELEPHONE ENCOUNTER
Copied from CRM #[id number]. Topic: General Inquiry - Patient Advice  >> Douglas 10, 2025  9:58 AM Jeanne wrote:  .1MEDICALADVICE     Patient is calling for Medical Advice regarding:pt  is asking for a callback from [facility]. No other info provide.    How long has patient had these symptoms:    Pharmacy name and phone#:    Patient wants a call back or thru [facility]:callback    Comments:[phone] (H)    Please advise patient replies from provider may take up to 48 hours.

## 2025-06-10 NOTE — TELEPHONE ENCOUNTER
FYI , will go to [facility] back for therapy   Waiting on results on urine culture   Before making an appointment in urology   Is requesting a full time staff doctor   Prefers seeing the same doctor allthe time

## 2025-06-11 ENCOUNTER — TELEPHONE (OUTPATIENT)
Dept: INTERNAL MEDICINE | Facility: CLINIC | Age: 85
End: 2025-06-11
Payer: MEDICARE

## 2025-06-11 NOTE — TELEPHONE ENCOUNTER
Called pt; pt answered    Pt complaining that she cannot sleep at night due to pain in her lower back     Pt reports it as a 8-9/10 pain today   Pt does not sound like she was in distress    Pt was seen 6/09/25 for sciatica by Dr Locke      How would you like to proceed?     Forwarded to Dr Locke and Dr Beauchamp (PCP)

## 2025-06-11 NOTE — TELEPHONE ENCOUNTER
Copied from CRM #[id number]. Topic: General Inquiry - Patient Advice  >> Jun 11, 2025  9:53 AM Eleonora wrote:  Patient would like to get medical advice.  Symptoms (please be specific):   Pt is requesting to speak with nurse Susan. Pt did not provide any other info for the message.   How long have you had these symptoms: N/A  Would you like a call back, or a response through your [facility] portal?:   call back to pt [phone]   Pharmacy name and phone # (copy from chart):   N/A  Comments:

## 2025-06-12 LAB — BACTERIA UR CULT: ABNORMAL

## 2025-06-12 RX ORDER — MELOXICAM 15 MG/1
7.5 TABLET ORAL DAILY
Qty: 5 TABLET | Refills: 0 | Status: SHIPPED | OUTPATIENT
Start: 2025-06-12 | End: 2025-06-12

## 2025-06-12 RX ORDER — MELOXICAM 7.5 MG/1
7.5 TABLET ORAL DAILY
Qty: 10 TABLET | Refills: 0 | Status: SHIPPED | OUTPATIENT
Start: 2025-06-12 | End: 2025-06-22

## 2025-06-12 RX ORDER — SULFAMETHOXAZOLE AND TRIMETHOPRIM 800; 160 MG/1; MG/1
1 TABLET ORAL 2 TIMES DAILY
Qty: 6 TABLET | Refills: 0 | Status: SHIPPED | OUTPATIENT
Start: 2025-06-12 | End: 2025-06-15

## 2025-06-12 NOTE — TELEPHONE ENCOUNTER
Returned call to patient about her back pain. Will send 10 day course of Meloxicam while patient waits to establish care with pain clinic. Discussed with about her ASA allergy. Reports that she has an upset stomach with the ASA but denies any anaphylactic symptoms, rash, itchiness. Reviewed Ucx. Patient growing E Coli. Will send out 3 day course of Bactrim.     Lorene Beauchamp DO  Internal Medicine PGY-2  [facility]

## 2025-06-13 NOTE — PROGRESS NOTES
I have personally discussed  this patient and agree with the resident, and agree with  their note as stated above with the following thoughts:   either PT or healthy back-- If she fells this, we can have her see pain clinic for intervention.

## 2025-06-19 ENCOUNTER — CLINICAL SUPPORT (OUTPATIENT)
Dept: REHABILITATION | Facility: OTHER | Age: 85
End: 2025-06-19
Payer: MEDICARE

## 2025-06-19 DIAGNOSIS — R29.898 DECREASED STRENGTH OF TRUNK AND BACK: ICD-10-CM

## 2025-06-19 DIAGNOSIS — Z74.09 IMPAIRED FUNCTIONAL MOBILITY, BALANCE, GAIT, AND ENDURANCE: Primary | ICD-10-CM

## 2025-06-19 DIAGNOSIS — G89.29 CENTRAL SENSITIZATION TO PAIN: ICD-10-CM

## 2025-06-19 DIAGNOSIS — M54.30 SCIATICA, UNSPECIFIED LATERALITY: ICD-10-CM

## 2025-06-19 PROCEDURE — 97110 THERAPEUTIC EXERCISES: CPT

## 2025-06-19 PROCEDURE — 97530 THERAPEUTIC ACTIVITIES: CPT

## 2025-06-19 PROCEDURE — 97162 PT EVAL MOD COMPLEX 30 MIN: CPT

## 2025-06-19 NOTE — PROGRESS NOTES
"  Outpatient Rehab    Physical Therapy Evaluation    Patient Name: Kathya Romero  MRN: [id number]  YOB: 1940  Encounter Date: 6/19/2025    Therapy Diagnosis:   Encounter Diagnoses   Name Primary?    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     Decreased strength of trunk and back      Physician: Carin Locke MD    Physician Orders: PT Eval and Treat  Medical Diagnosis from Referral: M54.30 (ICD-10-CM) - Sciatica, unspecified laterality   Evaluation Date: 6/19/2025  Authorization Period Expiration: 06/0925  Plan of Care Expiration: 8/28/2025  Reassessment Due: 7/19/2025  Visit # / Visits authorized: 01/01  Preferred name "Rosita" (spelled Nicole)   MedX testing visit 2    Time In: 1100  Time Out: 1200  Total Billable Time: 60 minutes  INSURANCE and OUTCOMES: Fee for Service with FOTO Outcomes 1/3    Precautions: standard    Pattern of pain determined: Pattern 5  4PEP attempt    Subjective     Date of onset: chronic L side /c exacerbation 3 weeks ago   History of current condition: Kathya reports mid back discomfort /c progression to LB and BLE (L > R) from post thigh to post lower leg to toes.  Patient report "all of a sudden" waking up unable to walk /c inc R side pain.  Patient report N/T LLE intermittent in presentation. Patient report R side sx have improved but L side still elevated.  Patient note sx most intense in AM.  Patient describe spinal sx as ache constant in presentation.  Patient describe BLE sx as upper leg ache constant in nature but lower leg /c intermittent tingling.  Patient prioritize BLE sx today.     Patient report inc pain /c standing and sitting.  Prior to recent improvements, patient report extended time in bed.  Patient report dec amb tolerance and difficulty /c stairs noting inc pain /c ascending.  Patient report having stopped attending gym based aerobic classes due to pain.  " Patient report pain /c bending/lifting and reaching down to her cabinets. Patient report sleep is disturbed from discomfort making it hard to fall back asleep in middle night.  Patient report difficulty rotating in bed for comfort.   Patient report no sig concern for balance noting her leg does not feel like giving out on her.    Patient rx baclofen at referral MD visit and notes some improvement /c meds.   Patient report no prior PT for LB sx.       Per MD report   Ms. Kathya Romero is a 84 y.o. female with a history of HTN, stroke, HLD, lumbar radiculopathy, osteoporosis who presented to the clinic for upper back pain which radiates down to her lower back and down to both legs. She has hx of sciatica of left leg.      For the past 10 days, patient is complaining of pain in the mid thoracic back, radiating to buttocks and radiating to both legs. She reports this as a cramping pain in the right leg. This with her chronic sciatica, she describes the pain as 7-8/10 pain. She notices it first thing in the morning, and causes difficulty with ambulation along with going up and down the stairs.  First thing in the morning, and going up and down the stairs. Given her long standing history of sciatica, she has done multiple rounds of PT. She denies any red flag sxs like saddle anesthesia, or urinary or fecal incontinence.   She is also c/o pain with urination, similar to prior UTI sxs. Patient reports that prior Ucx positive for Ecoli, and was treated with cephalex for 7 days.      Medical History:   Past Medical History:   Diagnosis Date    Cerebral aneurysm     Hemorrhagic stroke     Supraventricular tachycardia        Surgical History:   Kathya Romero  has a past surgical history that includes Hysterectomy (1984); Tonsillectomy; Hemorrhoid surgery (1975); Gallbladder surgery (2006); Cerebral aneurysm repair; and Oophorectomy (1984).    Medications:   Kathya has a current medication list which includes the  following prescription(s): amlodipine, apixaban, atorvastatin, baclofen, calcium carbonate, cholecalciferol (vitamin d3), cyanocobalamin, fluticasone propionate, gabapentin, losartan-hydrochlorothiazide 50-12.5 mg, meloxicam, metoprolol succinate, and evenity.    Allergies:   Review of patient's allergies indicates:   Allergen Reactions    Aspirin Nausea And Vomiting    Nitrofurantoin Nausea And Vomiting        Imaging:   Per MD report:   EXAMINATION:  XR LUMBAR SPINE 2 OR 3 VIEWS     FINDINGS:  Vertebral bodies are intact.  Disc spaces are maintained except for minimal narrowing at the L5-S1 level.  No collapse or destruction is noted.     Impression:  See above     Electronically signed by:Surinder Garcia MD  Date:                                            03/06/2025  Time:                                           12:17        Prior Therapy: none  Prior Treatment: none  Social History: lives in 2 level home, (stairs) lives with their family (daughter)  Occupation: retired former convenience store owner   Leisure:     aerobic classes at gym   Prior Level of Function: Ind /c ADLs  Current Level of Function: Ind /c ADLs  DME owned/used: No  Gym Membership: Yes    Pain:  Current 7/10 LB  9/10 LLE, worst 7/10 LB 9/10, best 5/10 LB  5/10 LLE   Location: bilateral LB, B LE below knee (L > R)  Description: Aching LB  Tingle lower leg   Aggravating Factors: Sitting, Standing, Bending, Walking, and Lifting  Easing Factors: laying in bed  Disturbed Sleep: Yes    Pattern of pain questions:  1.  Where is your pain the worst? BLE  2.  Is your pain constant or intermittent? Constant LB  LE intermittent   3.  Does bending forward make your typical pain worse? Y  4.  Since the start of your back pain, has there been a change in your bowel or bladder? N  5.  What can't you do now that you use to be able to do? walking, household chores, participate in gym classes, ascend/descend stairs    Pts goals:  return to aerobic classes,  "improved tolerance to stairs, inc amb tolerance      Red Flag Screening:   Cough/Sneeze Strain: (--)  Bladder/Bowel: (--)  Falls: (--)  Night pain: (--)  Unexplained weight loss: (--)  General health: patient report "good"     Objective      Postural examination/scapula alignment: mild fwd head  Joint integrity: Firm end feeling    Correction of posture: no effect with lumbar roll  Sitting: need of foot rest  Standing: inc R WB  Palpation:  TTP     MOVEMENT LOSS - Lumbar   Norms ROM Loss Initial   Flexion Fingers touch toes, sacral angle >/= 70 deg, uniform spinal curvature, posterior weight shift  moderate loss P!   Extension ASIS surpasses toes, spine of scapulae surpasses heels, uniform spinal curve major loss   Side glide Right  moderate loss   Side glide Left  moderate loss   Rotation Right PT observes contralateral shoulder minimal loss   Rotation Left PT observes contralateral shoulder moderate loss     Lower Extremity Strength  Right LE  Left LE    Hip flexion: 4-/5 Hip flexion: 4-/5   Hip extension:  4-/5 Hip extension: 4-/5   Hip abduction: 4-/5 Hip abduction: 4-/5   Hip adduction:  4+/5 Hip adduction:  4+/5   Hip External Rotation 4/5 Hip External Rotation 4/5   Hip Internal rotation   4/5 Hip Internal rotation 4/5 P!   Knee Flexion 4+/5 Knee Flexion 4+/5 P!   Knee Extension 4+/5 Knee Extension 4+/5   Ankle dorsiflexion: 4+/5 Ankle dorsiflexion: 4+/5 irregular activation     GAIT:  Assistive Device used: none  Level of Assistance: independent  Patient displays the following gait deviations: no gait deviations observed.     Special Tests:   Test Name  Test Result   Milgrams  (+)   SI Joint Provocation Test (--)   Straight Leg Raise (+) LLE   Neural Tension Test (+) LLE   Walking on toes Not able   Walking on heels  Not able     NEUROLOGICAL SCREENING:     Sensory deficits:    BLE LT intact   L5 myotome intact   Saddle sensation intact     Reflexes:    Left Right   Patella Tendon 2+ 1+   Achilles Tendon " Unable to elicit 1+   Clonus Spasticity to DF motion (--)     REPEATED TEST MOVEMENTS:    Baseline symptoms: 7/10 LBP  Repeated Flexion in Standing worse   Repeated Extension in Standing no effect   Repeated Flexion in lying no effect   Repeated Extension in lying  no effect LLE, improved lumbar       STATIC TESTS and other movements: 7/10 LBP  Prone lie no effect   Prone lie on elbows no effect   Sitting slouched  no effect   Sitting erect no effect     Lumbar testing Visit 2    5TSTS:   06/19/25:  22.95 BUE used first 2 reps, inc LLE sx     OUTCOMES SELECTION:   Intake Outcome Measure for FOTO LUMBAR Survey    Therapist reviewed FOTO scores for Snehlata D Romero on 6/19/2025.   FOTO documents entered into [employer] - see Media section.    Intake Score: 40% functional ability    Goal Score: 63% functional ability    MDC = 7 points            Treatment:  Therapeutic Exercise  TE 1: HEP demo include:  TE 2: LTR x 10 cue to stay in non pain provoking range  TE 3: PPT x 10 cue for dec valsalva  TE 4: Bridges /c PPT x 5  TE 5: Supine clamshells no Tband for hip mobility/dec fear of hip mobility  TE 6: Prone press ups x 10 for postural correction and mobility  Balance/Neuromuscular Re-Education  NMR 1: MedX Testing:  MedX testing to be performed next visit  Therapeutic Activity  TA 1: Pt educated on Healthy Back protocol (see below)  - Patient was given an [facility] Visit 1 handout which discusses the following:  - what to expect in therapy  - an overview of the program, including health coaching and wellness  - importance of spinal hygiene, proper posture, lifting mechanics, sleep quality, and nutrition/hydration   - Julián roll trialed, recommended, and purchase information was provided.  - Patient received a handout regarding anticipated muscular soreness following the isometric test and strategies for management were reviewed with patient including stretching, using ice and scheduled rest.   - Patient received  verbal education on the following:   - Healthy Back program   - purpose of the isometric test  - safe progression of lumbar strengthening, wellness approach, and systemic strengthening.   - safe usage of MedX machine and testing protocols.    Time Entry(in minutes):       Assessment & Plan   Assessment  Kathya presents with a condition of Moderate complexity.   Presentation of Symptoms: Changing  Will Comorbidities Impact Care: Yes  PMHx cerebral aneurysm    Functional Limitations: Activity tolerance, Completing self-care activities, Decreased ambulation distance/endurance, Participating in leisure activities, Disrupted sleep pattern, Functional mobility, Performing household chores, Pain with ADLs/IADLs, Sitting tolerance, Squatting, Standing tolerance, Bed mobility  Impairments: Activity intolerance, Impaired physical strength, Pain with functional activity  Personal Factors Affecting Prognosis: Physical limitations, Pain, Fear/anxiety    Patient Goal for Therapy (PT): Pts goals:  return to aerobic classes, improved tolerance to stairs, inc amb tolerance  Prognosis: Fair  Assessment Details: Kathya is a 84 y.o. female referred to [facility] with a medical diagnosis of M54.30 (ICD-10-CM) - Sciatica, unspecified laterality . Pt presents with signs and sx consistent /c diagnosis including dec lumbar ROM, B lumbar paraspinal weakness, B hip weakness, poor lumbopelvic motor control, soft tissue dysfunction and LBP leading to sig dec functional strength, mobility and activity tolerance. Upon physical exam patient demonstrate dec endurance and fear of pain /c central sensitization especially related to LLE.  Repeated motions inconclusive, however, extension bias did not exacerbate sx and offered improved postural mobility in the moment.  Therefore, HEP include extension bias.  HEP also include lumbopelvic region strengthening to address observed weaknesses. Plan lumbar MedX IM testing next visit for  baseline measure per Healthy Back protocol.  Patient will also benefit from PNE interspersed into HB protocol/education to dec fear of movement and improve self efficacy.  All of the above noted supports potential lumbar classification as a pattern 5 /c 4PEP trial with recurrent and consistent symptoms, thus pt is a good candidate for the Healthy Back Program. Patient would benefit from LE and trunk mobility training, stability training,  improved cardiovascular and muscular endurance, neuromuscular re-education for posture, coordination, and muscular recruitment and education on positional offloading techniques to decrease the intensity and frequency of flare-ups.  Patient's increased psychosocial concerns /c central sensitization present an unstable clinical presentation which may limit progress, but the intrinsic motivation to improve will assist.  Plan to reassess patient tolerance at visit 10 /c possible transition to FRP if no change in pain presentation despite objective strength and mobility gains.       Pain Pattern: Pattern 5 4PEP trial      Pt prognosis is Fair.    Pt will benefit from skilled outpatient Physical Therapy to address the deficits stated above and in the chart below, to provide pt/family education, and to maximize pt's level of independence. Based on the above history and physical examination an active physical therapy program is recommended.       Plan  From a physical therapy perspective, the patient would benefit from: Skilled Rehab Services    Planned therapy interventions include: Therapeutic exercise, Therapeutic activities, Neuromuscular re-education, Manual therapy, and ADLs/IADLs.    Planned modalities to include: Cryotherapy (cold pack) and Thermotherapy (hot pack).        Visit Frequency: 2 times Per Week for 10 Weeks.       This plan was discussed with Patient.   Discussion participants: Agreed Upon Plan of Care  Plan details: Healthy Back protocol 2/week for isolated medx  strengthening, whole body strengthening, therapy, manual skills and modalities as needed          The patient's spiritual, cultural, and educational needs were considered, and the patient is agreeable to the plan of care and goals.           Goals:   Active       LTG       Pt will demonstrate increased lumbar MedX ROM by at least 9 degrees from initial ROM value, resulting in improved ability to perform functional forward bending while standing and sitting.       Start:  06/19/25    Expected End:  09/05/25            Pt will demonstrate increased MedX average isometric strength value by 20% from initial test resulting in improved ability to perform bending, lifting, and carrying activities safely and confidently.        Start:  06/19/25    Expected End:  09/05/25            Pt to demonstrate ability to independently control and reduce their pain through posture positioning and mechanical movements throughout a typical day.       Start:  06/19/25    Expected End:  09/05/25            Pt will demonstrate reduced pain and improved functional outcomes as reported on the FOTO by reaching an intake score of >/= 63% functional ability in order to demonstrate subjective improvement in patient's condition.        Start:  06/19/25    Expected End:  09/05/25            Pt will demonstrate independence with the HEP at discharge.       Start:  06/19/25    Expected End:  09/05/25               Patient specific functional goals        Patient ascend/descend 2 flights of stairs /c inc LB discomfort /c or /s handrails in < 60 sec for improved access in home       Start:  06/19/25    Expected End:  09/05/25            Patient lift 10# KB floor to plinth x 10 /s inc LB or LLE discomfort for inc tolerance to household chores       Start:  06/19/25    Expected End:  09/05/25            Patient complete 5TSTS < 15 sec /c inc LB or LLE discomfort indicating improved BLE functional strength and dec fall risk       Start:  06/19/25     Expected End:  09/05/25            Patient report achieving > 5000 steps in a day /s leading to debilitating LB or LLE discomfort indicating improved tolerance to recreational tasks        Start:  06/19/25    Expected End:  09/05/25            Patient transition from seated<>supine<>prone x 2 < 30 sec /s inc LB or LLE discomfort indicating improved functional and bed mobility        Start:  06/19/25    Expected End:  09/05/25            Patient report return to regular gym classes (variety of exercise themes) /s inc LB or LLE discomfort indicating improved self efficacy and tolerance to recreational activities       Start:  06/19/25    Expected End:  09/05/25               STG       Pt will demonstrate increased lumbar MedX ROM by at least 6 degrees from the initial ROM value with improvements noted in functional ROM and ability to perform ADLs       Start:  06/19/25    Expected End:  09/05/25            Pt will demonstrate increased MedX average isometric strength value by 15% from initial test resulting in improved ability to perform bending, lifting, and carrying activities safely, confidently.       Start:  06/19/25    Expected End:  09/05/25            Pt will report a reduction in worst pain score by 1-2 points for improved tolerance for household chores.       Start:  06/19/25    Expected End:  09/05/25            Pt able to perform HEP correctly with minimal cueing or supervision from therapist to encourage independent management of symptoms       Start:  06/19/25    Expected End:  09/05/25                Prabhakar Kelley, PT

## 2025-06-23 ENCOUNTER — CLINICAL SUPPORT (OUTPATIENT)
Dept: REHABILITATION | Facility: OTHER | Age: 85
End: 2025-06-23
Payer: MEDICARE

## 2025-06-23 DIAGNOSIS — Z74.09 IMPAIRED FUNCTIONAL MOBILITY, BALANCE, GAIT, AND ENDURANCE: Primary | ICD-10-CM

## 2025-06-23 DIAGNOSIS — R29.898 DECREASED STRENGTH OF TRUNK AND BACK: ICD-10-CM

## 2025-06-23 DIAGNOSIS — G89.29 CENTRAL SENSITIZATION TO PAIN: ICD-10-CM

## 2025-06-23 PROCEDURE — 97110 THERAPEUTIC EXERCISES: CPT

## 2025-06-23 PROCEDURE — 97112 NEUROMUSCULAR REEDUCATION: CPT

## 2025-06-23 NOTE — PROGRESS NOTES
"  Outpatient Rehab    Physical Therapy Visit    Patient Name: Kathya Romero  MRN: [id number]  YOB: 1940  Encounter Date: 6/23/2025    Therapy Diagnosis:   Encounter Diagnoses   Name Primary?    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     Decreased strength of trunk and back        Physician: Carin Locke MD    Physician Orders: Eval and Treat  Preferred name "Rosita"   Medical Diagnosis: Sciatica, unspecified laterality  Surgical Diagnosis: Not applicable for this Episode   Surgical Date: Not applicable for this Episode  Days Since Last Surgery: Not applicable for this Episode    Visit # / Visits Authorized:  1 / 82/20   Preferred name "Rosita"   Insurance Authorization Period: 6/19/2025 to 12/31/2025  Date of Evaluation: 6/19/2025 6/19/2025   Plan of Care Expiration: 8/28/2025 6/19/2025 to 9/5/2025      PT/PTA: PT   Number of PTA visits since last PT visit:0  Time In: 0900   Time Out: 1000  Total Time (in minutes): 60   Total Billable Time (in minutes): 30    FOTO:  Intake Score:  %  Survey Score 2:  %  Survey Score 3:  %    Precautions:     Pattern of pain determined: Pattern 5  4PEP attempt          Subjective   Patient report no sig change in typical sx since eval last week.  Patient report immediately /p eval having inc pain but return to norms over the weekend.  Patient report HEP performance but with dec reps noting they were challenging.   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   Patient agree to tx today..  Pain reported as 6/10.        Objective            Isometric Testing on Med X equipment: Testing administered by PT    Test Initial Midpoint Final   Spine area:LUMBAR      Date 06/23/25     ROM 0 - 51 deg     Max Peak Torque 78      Min Peak Torque 16      Flex/Ext Ratio 4.875     % below normative data -51     % gain from initial test Not available visit 1          5TSTS:              " 06/19/25:  22.95 BUE used first 2 reps, inc LLE sx    06/23/25: 26.18 hands on thighs throughout, dec controlled descent      OUTCOMES SELECTION:   Intake Outcome Measure for FOTO LUMBAR Survey     Therapist reviewed FOTO scores for Kathya Romero on 6/19/2025.   FOTO documents entered into [employer] - see Media section.     Intake Score: 40% functional ability     Goal Score: 63% functional ability     MDC = 7 points                Treatment:  Therapeutic Exercise  TE 1: Recumbent Bike 5 min for functional endurance  TE 2: HEP demo include:  TE 3: LTR x 20 cue to stay in non pain provoking range  TE 4: PPT x 10 5 sec hold cue for dec valsalva  TE 5: Bridges /c PPT 2 x 5  TE 6: Supine clamshells no Tband for hip mobility/dec fear of hip mobility  TE 7: Prone press ups x 10 for postural correction and mobility        Time Entry(in minutes):  Neuromuscular Re-Education Time Entry: 15  Therapeutic Exercise Time Entry: 15    Assessment & Plan   Assessment: Kathya presents to second healthy back visit reporting no change in typical sx and continue high concern for dec functionality.  Patient report of sx reminding her of sx when having had prior major medical condition offer further evidence of importance of applying PNE in tx mode.  Today HEP reintroduced /c specific attention to safety messages.  For example, patient reminded how HEP performance does not change sx intensity indicating safety of performance.  Patient need mod cue /c HEP for technique but able to perform /s inc discomfort.  Pt was able to tolerate Medical MedX machine well as follows:  MedX testing performed and patient tolerated test well.  Patient falls 51% below norms and falls to 77% below at 0 degs indicating appropriateness of HB programming.  Of note, patient /c extended time taken between exercises.  Therefore, deferred most of MedX peripheral introduction as patient builds exercise tolerance.    Evaluation/Treatment Tolerance: Patient limited by  pain    The patient will continue to benefit from skilled outpatient physical therapy in order to address the deficits listed in the problem list on the initial evaluation, provide patient and family education, and maximize the patients level of independence in the home and community environments.     The patient's spiritual, cultural, and educational needs were considered, and the patient is agreeable to the plan of care and goals.           Plan: Cont PT per POC.  Attention to PNE messaging to improve pt self efficacy    Goals:   Active       LTG       Pt will demonstrate increased lumbar MedX ROM by at least 9 degrees from initial ROM value, resulting in improved ability to perform functional forward bending while standing and sitting. (Progressing)       Start:  06/19/25    Expected End:  09/05/25            Pt will demonstrate increased MedX average isometric strength value by 20% from initial test resulting in improved ability to perform bending, lifting, and carrying activities safely and confidently.  (Progressing)       Start:  06/19/25    Expected End:  09/05/25            Pt to demonstrate ability to independently control and reduce their pain through posture positioning and mechanical movements throughout a typical day. (Progressing)       Start:  06/19/25    Expected End:  09/05/25            Pt will demonstrate reduced pain and improved functional outcomes as reported on the FOTO by reaching an intake score of >/= 63% functional ability in order to demonstrate subjective improvement in patient's condition.  (Progressing)       Start:  06/19/25    Expected End:  09/05/25            Pt will demonstrate independence with the HEP at discharge. (Progressing)       Start:  06/19/25    Expected End:  09/05/25               Patient specific functional goals        Patient ascend/descend 2 flights of stairs /c inc LB discomfort /c or /s handrails in < 60 sec for improved access in home (Progressing)        Start:  06/19/25    Expected End:  09/05/25            Patient lift 10# KB floor to plinth x 10 /s inc LB or LLE discomfort for inc tolerance to household chores (Progressing)       Start:  06/19/25    Expected End:  09/05/25            Patient complete 5TSTS < 15 sec /c inc LB or LLE discomfort indicating improved BLE functional strength and dec fall risk (Progressing)       Start:  06/19/25    Expected End:  09/05/25            Patient report achieving > 5000 steps in a day /s leading to debilitating LB or LLE discomfort indicating improved tolerance to recreational tasks  (Progressing)       Start:  06/19/25    Expected End:  09/05/25            Patient transition from seated<>supine<>prone x 2 < 30 sec /s inc LB or LLE discomfort indicating improved functional and bed mobility  (Progressing)       Start:  06/19/25    Expected End:  09/05/25            Patient report return to regular gym classes (variety of exercise themes) /s inc LB or LLE discomfort indicating improved self efficacy and tolerance to recreational activities (Progressing)       Start:  06/19/25    Expected End:  09/05/25               STG       Pt will demonstrate increased lumbar MedX ROM by at least 6 degrees from the initial ROM value with improvements noted in functional ROM and ability to perform ADLs (Progressing)       Start:  06/19/25    Expected End:  09/05/25            Pt will demonstrate increased MedX average isometric strength value by 15% from initial test resulting in improved ability to perform bending, lifting, and carrying activities safely, confidently. (Progressing)       Start:  06/19/25    Expected End:  09/05/25            Pt will report a reduction in worst pain score by 1-2 points for improved tolerance for household chores. (Progressing)       Start:  06/19/25    Expected End:  09/05/25            Pt able to perform HEP correctly with minimal cueing or supervision from therapist to encourage independent management of  symptoms (Progressing)       Start:  06/19/25    Expected End:  09/05/25                Prabhakar Kelley PT

## 2025-06-25 ENCOUNTER — CLINICAL SUPPORT (OUTPATIENT)
Dept: REHABILITATION | Facility: OTHER | Age: 85
End: 2025-06-25
Payer: MEDICARE

## 2025-06-25 DIAGNOSIS — R29.898 DECREASED STRENGTH OF TRUNK AND BACK: ICD-10-CM

## 2025-06-25 DIAGNOSIS — G89.29 CENTRAL SENSITIZATION TO PAIN: ICD-10-CM

## 2025-06-25 DIAGNOSIS — Z74.09 IMPAIRED FUNCTIONAL MOBILITY, BALANCE, GAIT, AND ENDURANCE: Primary | ICD-10-CM

## 2025-06-25 PROCEDURE — 97110 THERAPEUTIC EXERCISES: CPT | Mod: CQ

## 2025-06-25 PROCEDURE — 97112 NEUROMUSCULAR REEDUCATION: CPT | Mod: CQ

## 2025-06-25 NOTE — PROGRESS NOTES
"  Outpatient Rehab    Physical Therapy Visit    Patient Name: Kathya Romero  MRN: [id number]  YOB: 1940  Encounter Date: 6/25/2025    Therapy Diagnosis:   Encounter Diagnoses   Name Primary?    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     Decreased strength of trunk and back        Physician: Carin Locke MD    Physician Orders: Eval and Treat  Preferred name "Rosita"   Medical Diagnosis: Sciatica, unspecified laterality  Surgical Diagnosis: Not applicable for this Episode   Surgical Date: Not applicable for this Episode  Days Since Last Surgery: Not applicable for this Episode    Visit # / Visits Authorized:  2 / 82/20   Preferred name "Rosita"   Insurance Authorization Period: 6/19/2025 to 12/31/2025  Date of Evaluation: 6/19/2025 6/19/2025   Plan of Care Expiration: 8/28/2025 6/19/2025 to 9/5/2025      PT/PTA: PTA   Number of PTA visits since last PT visit:1  Time In: 0900   Time Out: 1000  Total Time (in minutes): 60   Total Billable Time (in minutes): 30    FOTO:  Intake Score:  %  Survey Score 2:  %  Survey Score 3:  %    Precautions:          Subjective   Patient reports temporary relief after last session. She c/o 8/10 lumbar pain with L>R LE radicular symptoms..  Pain reported as 8/10.        Objective            Isometric Testing on Med X equipment: Testing administered by PT    Test Initial Midpoint Final   Spine area:LUMBAR      Date 06/23/25     ROM 0 - 51 deg     Max Peak Torque 78      Min Peak Torque 16      Flex/Ext Ratio 4.875     % below normative data -51     % gain from initial test Not available visit 1          5TSTS:              06/19/25:  22.95 BUE used first 2 reps, inc LLE sx    06/23/25: 26.18 hands on thighs throughout, dec controlled descent      OUTCOMES SELECTION:   Intake Outcome Measure for FOTO LUMBAR Survey     Therapist reviewed FOTO scores for Kathya Romero on 6/19/2025.   FOTO documents entered into [employer] " "- see Media section.     Intake Score: 40% functional ability     Goal Score: 63% functional ability     MDC = 7 points                Treatment:  Therapeutic Exercise  TE 1: Recumbent Bike 5 min for functional endurance  TE 3: LTR 10x 5"  cue to stay in non pain provoking range  TE 4: PPT x 10 5 sec hold cue for dec valsalva  TE 5: Bridges /c PPT 2 x 5  TE 7: Prone press ups x 10 for postural correction and mobility  TE 8: + GUERLINE supine HSS 15"x2     + GUERLINE Supine nerve glides 2x10    Peripheral muscle strengthening which included one set of 15-20 repetitions at a slow and controlled 10-13 second per rep pace focused on strengthening supporting musculature in order to improve body mechanics and functional mobility. Patient and therapist focused on proper form during treatment to ensure optimal strengthening of each targeted muscle group.  Machines utilized included:Torso rotation, Leg Ext, Leg Curl, Chest Press, and RowingTriceps, Biceps, Hip Abd, Hip Add, and Leg Press           Medical MedX Treatment as follows:  Patient received neuromuscular education for 10 minutes via participation on the Medical MedX Machine. Therapist assisted patient in isolating and engaging spinal stabilization musculature in order to improve functional ability and postural control. Patient performed exercise with therapist guidance in order to accurately use pacer function, avoid valsalva, and optimally exert effort within a safe and effective range via the Chad Exertion Rating Scale. Patient instructed to perform at a midrange of exertion and to complete 15-20 repetitions within appropriate split time, with proper technique, and while maintaining safety.               6/25/2025    10:54 AM   [facility]   Visit Number 3   VAS Pain Rating 8   Time 5   Extension in Lying 10   Flexion in Lying 10   Lumbar Weight 35 lbs   Repetitions 15   Rating of Perceived Exertion 5   Ice - Z Lie (in min.) 0           Time Entry(in " minutes):  Neuromuscular Re-Education Time Entry: 10  Therapeutic Exercise Time Entry: 20    Assessment & Plan   Assessment:  Kathya presents to healthy back session with reports of persistent lumbar pain and GUERLINE LE radicular symptoms, demonstrating slow and antalgic gait pattern upon entry. She was instructed in therex for pain reduction, responding well to introduced hamstring stretches and nerve glides. Cues provided for proper performance of PPT and breathing technique on Medx. STM tolerated with tightness/tenderness at GUERLINE glute med region. Lumbar MedX completed with resistance of 35 lbs/ft, achieving 15 reps at 5/10 RPE. Half of peripheral training completed without adverse effects.      Evaluation/Treatment Tolerance: Patient tolerated treatment well    The patient will continue to benefit from skilled outpatient physical therapy in order to address the deficits listed in the problem list on the initial evaluation, provide patient and family education, and maximize the patients level of independence in the home and community environments.     The patient's spiritual, cultural, and educational needs were considered, and the patient is agreeable to the plan of care and goals.           Plan: Cont PT per POC.  Attention to PNE messaging to improve pt self efficacy    Goals:   Active       LTG       Pt will demonstrate increased lumbar MedX ROM by at least 9 degrees from initial ROM value, resulting in improved ability to perform functional forward bending while standing and sitting. (Progressing)       Start:  06/19/25    Expected End:  09/05/25            Pt will demonstrate increased MedX average isometric strength value by 20% from initial test resulting in improved ability to perform bending, lifting, and carrying activities safely and confidently.  (Progressing)       Start:  06/19/25    Expected End:  09/05/25            Pt to demonstrate ability to independently control and reduce their pain through  posture positioning and mechanical movements throughout a typical day. (Progressing)       Start:  06/19/25    Expected End:  09/05/25            Pt will demonstrate reduced pain and improved functional outcomes as reported on the FOTO by reaching an intake score of >/= 63% functional ability in order to demonstrate subjective improvement in patient's condition.  (Progressing)       Start:  06/19/25    Expected End:  09/05/25            Pt will demonstrate independence with the HEP at discharge. (Progressing)       Start:  06/19/25    Expected End:  09/05/25               Patient specific functional goals        Patient ascend/descend 2 flights of stairs /c inc LB discomfort /c or /s handrails in < 60 sec for improved access in home (Progressing)       Start:  06/19/25    Expected End:  09/05/25            Patient lift 10# KB floor to plinth x 10 /s inc LB or LLE discomfort for inc tolerance to household chores (Progressing)       Start:  06/19/25    Expected End:  09/05/25            Patient complete 5TSTS < 15 sec /c inc LB or LLE discomfort indicating improved BLE functional strength and dec fall risk (Progressing)       Start:  06/19/25    Expected End:  09/05/25            Patient report achieving > 5000 steps in a day /s leading to debilitating LB or LLE discomfort indicating improved tolerance to recreational tasks  (Progressing)       Start:  06/19/25    Expected End:  09/05/25            Patient transition from seated<>supine<>prone x 2 < 30 sec /s inc LB or LLE discomfort indicating improved functional and bed mobility  (Progressing)       Start:  06/19/25    Expected End:  09/05/25            Patient report return to regular gym classes (variety of exercise themes) /s inc LB or LLE discomfort indicating improved self efficacy and tolerance to recreational activities (Progressing)       Start:  06/19/25    Expected End:  09/05/25               STG       Pt will demonstrate increased lumbar MedX ROM by at  least 6 degrees from the initial ROM value with improvements noted in functional ROM and ability to perform ADLs (Progressing)       Start:  06/19/25    Expected End:  09/05/25            Pt will demonstrate increased MedX average isometric strength value by 15% from initial test resulting in improved ability to perform bending, lifting, and carrying activities safely, confidently. (Progressing)       Start:  06/19/25    Expected End:  09/05/25            Pt will report a reduction in worst pain score by 1-2 points for improved tolerance for household chores. (Progressing)       Start:  06/19/25    Expected End:  09/05/25            Pt able to perform HEP correctly with minimal cueing or supervision from therapist to encourage independent management of symptoms (Progressing)       Start:  06/19/25    Expected End:  09/05/25                Francia Horne PTA

## 2025-06-26 ENCOUNTER — TELEPHONE (OUTPATIENT)
Dept: INTERNAL MEDICINE | Facility: CLINIC | Age: 85
End: 2025-06-26
Payer: MEDICARE

## 2025-06-26 NOTE — TELEPHONE ENCOUNTER
Copied from CRM #[id number]. Topic: General Inquiry - Patient Advice  >> Jun 26, 2025  8:15 AM Andra wrote:  .1MEDICALADVICE     Patient is calling for Medical Advice regarding:Back and leg pain    How long has patient had these symptoms:Ongoing     Pharmacy name and phone#:      Patient wants a call back or thru [facility], provide patient's call back phone number: call back at:[phone]    Comments: pt said that she is calling in regards to needing to get a return call from Dr Beauchamp pt stated that she has finished taking the medication that he prescribed for her for her back and leg pains but she is still having the pains and would like to know what to do . Please advise     Please advise patient replies from provider may take up to 48 hours.

## 2025-06-26 NOTE — TELEPHONE ENCOUNTER
Pt last saw Dr Locke 6/09/25    Pt last saw Dr Beauchamp 3/06/25    Pt reports finishing medication prescribed for back and leg pain and reports continued pain    Pt inquiring about next steps     Should pt be seen in clinic for follow up evaluation?

## 2025-06-27 ENCOUNTER — PATIENT OUTREACH (OUTPATIENT)
Facility: OTHER | Age: 85
End: 2025-06-27
Payer: MEDICARE

## 2025-06-27 NOTE — PROGRESS NOTES
Patient contacted [facility] on 6/9/25 with c/o upper back, leg pain, and painful urination.  [facility] was consulted and the disposition recommendation was Primary Care.  Appointment was scheduled 6/9/25 at 1:00 with Sharma, Sakshi, MD [facility], [facility].     Called patient for appointment follow up to ensure that care needs were met at PCP appointment, and to assess for additional needs or concerns to be addressed.  A second follow up call is scheduled 6/28/25.      Emerald Quevedo  ED Navigator

## 2025-06-30 ENCOUNTER — CLINICAL SUPPORT (OUTPATIENT)
Dept: REHABILITATION | Facility: OTHER | Age: 85
End: 2025-06-30
Payer: MEDICARE

## 2025-06-30 DIAGNOSIS — R29.898 DECREASED STRENGTH OF TRUNK AND BACK: ICD-10-CM

## 2025-06-30 DIAGNOSIS — Z74.09 IMPAIRED FUNCTIONAL MOBILITY, BALANCE, GAIT, AND ENDURANCE: Primary | ICD-10-CM

## 2025-06-30 DIAGNOSIS — G89.29 CENTRAL SENSITIZATION TO PAIN: ICD-10-CM

## 2025-06-30 PROCEDURE — 97110 THERAPEUTIC EXERCISES: CPT

## 2025-06-30 PROCEDURE — 97112 NEUROMUSCULAR REEDUCATION: CPT

## 2025-06-30 NOTE — PROGRESS NOTES
"  Outpatient Rehab    Physical Therapy Visit    Patient Name: Kathya Romero  MRN: [id number]  YOB: 1940  Encounter Date: 6/30/2025    Therapy Diagnosis:   Encounter Diagnoses   Name Primary?    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     Decreased strength of trunk and back        Physician: Carin Locke MD    Physician Orders: Eval and Treat  Preferred name "Rosita"   Medical Diagnosis: Sciatica, unspecified laterality  Surgical Diagnosis: Not applicable for this Episode   Surgical Date: Not applicable for this Episode  Days Since Last Surgery: Not applicable for this Episode    Visit # / Visits Authorized:  3 / 8  + eval Preferred name "Rosita"   Insurance Authorization Period: 6/19/2025 to 12/31/2025  Date of Evaluation: 6/19/2025   Plan of Care Expiration: 8/28/2025 6/19/2025 to 9/5/2025      PT/PTA: PT   Number of PTA visits since last PT visit:1  Time In: 0930   Time Out: 1030  Total Time (in minutes): 60   Total Billable Time (in minutes): 30    FOTO:  Intake Score:  %  Survey Score 2:  %  Survey Score 3:  %    Precautions:          Subjective   Patient report having walked to grocery store over the weekend.  Patient report mild inc discomfort upon completing.  However, patient report intent to continue walking bouts.    Patient report continue HEP performance but /c no change in ease to complete..  Pain reported as 8/10.        Objective            Isometric Testing on Med X equipment: Testing administered by PT    Test Initial Midpoint Final   Spine area:LUMBAR      Date 06/23/25     ROM 0 - 51 deg     Max Peak Torque 78      Min Peak Torque 16      Flex/Ext Ratio 4.875     % below normative data -51     % gain from initial test Not available visit 1          5TSTS:              06/19/25:  22.95 BUE used first 2 reps, inc LLE sx    06/23/25: 26.18 hands on thighs throughout, dec controlled descent         OUTCOMES SELECTION:   Intake " "Outcome Measure for FOTO LUMBAR Survey     Therapist reviewed FOTO scores for ehlata TASNEEM Romero on 6/19/2025.   FOTO documents entered into [employer] - see Media section.     Intake Score: 40% functional ability     Goal Score: 63% functional ability     MDC = 7 points                Treatment:  Therapeutic Exercise  TE 1: Recumbent Bike 5 min for functional endurance  TE 2: LTR 10x 5" cue to stay in non pain provoking range  TE 3: PPT x 10 5 sec hold cue for dec valsalva  TE 4: Bridges 2 x 10  TE 5: Supine clamshells no Tband for hip mobility/dec fear of hip mobility  TE 6: Prone press ups x 10 for postural correction and mobility - pt report dec BLE sx upon completion  TE 7: Supine clamshells x 20 Y Tband  TE 10: Peripheral muscle strengthening which included one set of 15-20 repetitions at a slow and controlled 10-13 second per rep pace focused on strengthening supporting musculature in order to improve body mechanics and functional mobility. Patient and therapist focused on proper form during treatment to ensure optimal strengthening of each targeted muscle group.  Machines utilized included:Torso rotation, Leg Ext, Leg Curl, Chest Press, and RowingTriceps, Biceps, Hip Abd, Hip Add, and Leg Press  Balance/Neuromuscular Re-Education  NMR 1: Lumbar MedX exercise (see below)  MedX exercise :  Patient received neuromuscular education  via participation on the Medical MedX Machine. Therapist assisted patient in isolating and engaging spinal stabilization musculature in order to improve functional ability and postural control. Patient performed exercise with therapist guidance in order to accurately use pacer function, avoid valsalva, and optimally exert effort within a safe and effective range via the Chad Exertion Rating Scale. Patient instructed to perform at a midrange of exertion and to complete 15-20 repetitions within appropriate split time, with proper technique, and while maintaining safety.          6/30/2025    " 10:05 AM   [facility]   Visit Number 4   VAS Pain Rating 8   Time 5   Extension in Lying 10   Lumbar Weight 35 lbs   Repetitions 5   Rating of Perceived Exertion 5   Ice - Z Lie (in min.) 0       Time Entry(in minutes):  Neuromuscular Re-Education Time Entry: 10  Therapeutic Exercise Time Entry: 50    Assessment & Plan   Assessment: Although patient subjective report indicate dec fear avoidance and mild improvement in activity tolerance, today, patient /c sig difficulty completing HEP demonstrate, needing to discontinue MedX due to discomfort and continues to perseverate on pain presentation.  Recommend revisiting diaphragmatic breathing during next session to initiate PNE elements and demonstrate improving HEP tolerance.  Despite limitation /c lumbar MedX, patient completed full complement of MedX peripheral exercises, albeit /c dec resistance, offering evidence to willingness and exercise tolerance.  Progress per patient tolerance.   Evaluation/Treatment Tolerance: Patient limited by pain      The patient will continue to benefit from skilled outpatient physical therapy in order to address the deficits listed in the problem list on the initial evaluation, provide patient and family education, and maximize the patients level of independence in the home and community environments.     The patient's spiritual, cultural, and educational needs were considered, and the patient is agreeable to the plan of care and goals.           Plan: Cont PT per POC.  Attention to PNE messaging to improve pt self efficacy    Goals:   Active       LTG       Pt will demonstrate increased lumbar MedX ROM by at least 9 degrees from initial ROM value, resulting in improved ability to perform functional forward bending while standing and sitting. (Progressing)       Start:  06/19/25    Expected End:  09/05/25            Pt will demonstrate increased MedX average isometric strength value by 20% from initial test resulting in  improved ability to perform bending, lifting, and carrying activities safely and confidently.  (Progressing)       Start:  06/19/25    Expected End:  09/05/25            Pt to demonstrate ability to independently control and reduce their pain through posture positioning and mechanical movements throughout a typical day. (Progressing)       Start:  06/19/25    Expected End:  09/05/25            Pt will demonstrate reduced pain and improved functional outcomes as reported on the FOTO by reaching an intake score of >/= 63% functional ability in order to demonstrate subjective improvement in patient's condition.  (Progressing)       Start:  06/19/25    Expected End:  09/05/25            Pt will demonstrate independence with the HEP at discharge. (Progressing)       Start:  06/19/25    Expected End:  09/05/25               Patient specific functional goals        Patient ascend/descend 2 flights of stairs /c inc LB discomfort /c or /s handrails in < 60 sec for improved access in home (Progressing)       Start:  06/19/25    Expected End:  09/05/25            Patient lift 10# KB floor to plinth x 10 /s inc LB or LLE discomfort for inc tolerance to household chores (Progressing)       Start:  06/19/25    Expected End:  09/05/25            Patient complete 5TSTS < 15 sec /c inc LB or LLE discomfort indicating improved BLE functional strength and dec fall risk (Progressing)       Start:  06/19/25    Expected End:  09/05/25            Patient report achieving > 5000 steps in a day /s leading to debilitating LB or LLE discomfort indicating improved tolerance to recreational tasks  (Progressing)       Start:  06/19/25    Expected End:  09/05/25            Patient transition from seated<>supine<>prone x 2 < 30 sec /s inc LB or LLE discomfort indicating improved functional and bed mobility  (Progressing)       Start:  06/19/25    Expected End:  09/05/25            Patient report return to regular gym classes (variety of exercise  themes) /s inc LB or LLE discomfort indicating improved self efficacy and tolerance to recreational activities (Progressing)       Start:  06/19/25    Expected End:  09/05/25               STG       Pt will demonstrate increased lumbar MedX ROM by at least 6 degrees from the initial ROM value with improvements noted in functional ROM and ability to perform ADLs (Progressing)       Start:  06/19/25    Expected End:  09/05/25            Pt will demonstrate increased MedX average isometric strength value by 15% from initial test resulting in improved ability to perform bending, lifting, and carrying activities safely, confidently. (Progressing)       Start:  06/19/25    Expected End:  09/05/25            Pt will report a reduction in worst pain score by 1-2 points for improved tolerance for household chores. (Progressing)       Start:  06/19/25    Expected End:  09/05/25            Pt able to perform HEP correctly with minimal cueing or supervision from therapist to encourage independent management of symptoms (Progressing)       Start:  06/19/25    Expected End:  09/05/25                  Prabhakar Kelley, PT

## 2025-07-01 ENCOUNTER — TELEPHONE (OUTPATIENT)
Dept: CARDIOLOGY | Facility: CLINIC | Age: 85
End: 2025-07-01
Payer: MEDICARE

## 2025-07-03 ENCOUNTER — CLINICAL SUPPORT (OUTPATIENT)
Dept: REHABILITATION | Facility: OTHER | Age: 85
End: 2025-07-03
Payer: MEDICARE

## 2025-07-03 ENCOUNTER — TELEPHONE (OUTPATIENT)
Dept: INTERNAL MEDICINE | Facility: CLINIC | Age: 85
End: 2025-07-03
Payer: MEDICARE

## 2025-07-03 DIAGNOSIS — Z74.09 IMPAIRED FUNCTIONAL MOBILITY, BALANCE, GAIT, AND ENDURANCE: Primary | ICD-10-CM

## 2025-07-03 DIAGNOSIS — G89.29 CENTRAL SENSITIZATION TO PAIN: ICD-10-CM

## 2025-07-03 DIAGNOSIS — R29.898 DECREASED STRENGTH OF TRUNK AND BACK: ICD-10-CM

## 2025-07-03 PROCEDURE — 97110 THERAPEUTIC EXERCISES: CPT | Mod: CQ

## 2025-07-03 PROCEDURE — 97112 NEUROMUSCULAR REEDUCATION: CPT | Mod: CQ

## 2025-07-03 NOTE — TELEPHONE ENCOUNTER
Copied from CRM #[id number]. Topic: Escalation - Escalation To Clinic  >> Jul 1, 2025  9:10 AM Kayleigh wrote:  Cc: Lorene Beauchamp, DO  Patient called, stated that she has been leaving messages for Dr Beauchamp, and no one has reach out to her. Would like a call back from nurse eller. Thank you.

## 2025-07-03 NOTE — PROGRESS NOTES
"  Outpatient Rehab    Physical Therapy Visit    Patient Name: Kathya Romero  MRN: [id number]  YOB: 1940  Encounter Date: 7/3/2025    Therapy Diagnosis:   Encounter Diagnoses   Name Primary?    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     Decreased strength of trunk and back        Physician: Carin Locke MD    Physician Orders: Eval and Treat  Preferred name "Rosita"   Medical Diagnosis: Sciatica, unspecified laterality  Surgical Diagnosis: Not applicable for this Episode   Surgical Date: Not applicable for this Episode  Days Since Last Surgery: Not applicable for this Episode    Visit # / Visits Authorized:  4 / 8  + eval Preferred name "Rosita"   Insurance Authorization Period: 6/19/2025 to 12/31/2025  Date of Evaluation: 6/19/2025   Plan of Care Expiration: 8/28/2025 6/19/2025 to 9/5/2025      PT/PTA: PTA   Number of PTA visits since last PT visit:1  Time In: 0900   Time Out: 1030  Total Time (in minutes): 90   Total Billable Time (in minutes): 30    FOTO:  Intake Score:  %  Survey Score 2:  %  Survey Score 3:  %    Precautions:          Subjective   Patient reports increase lumbar pain with GUERLINE LE radicular, L>R, since last session. She c/o pain limiting prolonged ambulation..  Pain reported as 8/10.        Objective            Isometric Testing on Med X equipment: Testing administered by PT    Test Initial Midpoint Final   Spine area:LUMBAR      Date 06/23/25     ROM 0 - 51 deg     Max Peak Torque 78      Min Peak Torque 16      Flex/Ext Ratio 4.875     % below normative data -51     % gain from initial test Not available visit 1          5TSTS:              06/19/25:  22.95 BUE used first 2 reps, inc LLE sx    06/23/25: 26.18 hands on thighs throughout, dec controlled descent         OUTCOMES SELECTION:   Intake Outcome Measure for FOTO LUMBAR Survey     Therapist reviewed FOTO scores for Kathya Romero on 6/19/2025.   FOTO documents " "entered into [employer] - see Media section.     Intake Score: 40% functional ability     Goal Score: 63% functional ability     MDC = 7 points                Treatment:  Therapeutic Exercise  TE 1: Recumbent Bike 5 min for functional endurance  TE 2: LTR 10x 5" cue to stay in non pain provoking range  TE 3: PPT x 10 5 sec hold cue for dec valsalva     + TrA destiny with ball 10x5"  TE 4: Bridges 15x  TE 5: Supine clamshells no Tband for hip mobility/dec fear of hip mobility  TE 6: Prone press ups x 10 for postural correction and mobility - pt report dec BLE sx upon completion  TE 7: Supine clamshells x15  Y Tband  TE 8: GUERLINE supine HSS 15"x2-np       --GUERLINE Supine nerve glides 2x10-np    Peripheral muscle strengthening which included one set of 15-20 repetitions at a slow and controlled 10-13 second per rep pace focused on strengthening supporting musculature in order to improve body mechanics and functional mobility. Patient and therapist focused on proper form during treatment to ensure optimal strengthening of each targeted muscle group.  Machines utilized included:Torso rotation, Leg Ext, Leg Curl, Chest Press, and RowingTriceps, Biceps, Hip Abd, Hip Add, and Leg Press           Medical MedX Treatment as follows:  Patient received neuromuscular education for 10 minutes via participation on the Medical MedX Machine. Therapist assisted patient in isolating and engaging spinal stabilization musculature in order to improve functional ability and postural control. Patient performed exercise with therapist guidance in order to accurately use pacer function, avoid valsalva, and optimally exert effort within a safe and effective range via the Chad Exertion Rating Scale. Patient instructed to perform at a midrange of exertion and to complete 15-20 repetitions within appropriate split time, with proper technique, and while maintaining safety.            7/3/2025     9:43 AM   [facility]   Visit Number 5   VAS Pain Rating 8 "   Time 5   Extension in Lying 10   Flexion in Lying 10   Lumbar Weight 30 lbs   Repetitions 14   Rating of Perceived Exertion 5   Ice - Z Lie (in min.) 5          Time Entry(in minutes):  Neuromuscular Re-Education Time Entry: 10  Therapeutic Exercise Time Entry: 20    Assessment & Plan   Assessment:  Patient presents to therapy with reports of increased lumbar symptoms and GUERLINE LE involvement. She was instructed in therex for pain reduction without  adverse effects, responding well to introduced TrA destiny with ball. Lumbar MedX resistance reduced to 30 lbs/ft with completion of 14 reps, requiring a rest break at 11 reps, at 5/10 RPE with demonstration of difficulty pacing. Peripheral training completed without adverse effects. Cardio handout issued with review.   Evaluation/Treatment Tolerance: Patient tolerated treatment well      The patient will continue to benefit from skilled outpatient physical therapy in order to address the deficits listed in the problem list on the initial evaluation, provide patient and family education, and maximize the patients level of independence in the home and community environments.     The patient's spiritual, cultural, and educational needs were considered, and the patient is agreeable to the plan of care and goals.           Plan: Cont PT per POC.  Attention to PNE messaging to improve pt self efficacy    Goals:   Active       LTG       Pt will demonstrate increased lumbar MedX ROM by at least 9 degrees from initial ROM value, resulting in improved ability to perform functional forward bending while standing and sitting. (Progressing)       Start:  06/19/25    Expected End:  09/05/25            Pt will demonstrate increased MedX average isometric strength value by 20% from initial test resulting in improved ability to perform bending, lifting, and carrying activities safely and confidently.  (Progressing)       Start:  06/19/25    Expected End:  09/05/25            Pt to  demonstrate ability to independently control and reduce their pain through posture positioning and mechanical movements throughout a typical day. (Progressing)       Start:  06/19/25    Expected End:  09/05/25            Pt will demonstrate reduced pain and improved functional outcomes as reported on the FOTO by reaching an intake score of >/= 63% functional ability in order to demonstrate subjective improvement in patient's condition.  (Progressing)       Start:  06/19/25    Expected End:  09/05/25            Pt will demonstrate independence with the HEP at discharge. (Progressing)       Start:  06/19/25    Expected End:  09/05/25               Patient specific functional goals        Patient ascend/descend 2 flights of stairs /c inc LB discomfort /c or /s handrails in < 60 sec for improved access in home (Progressing)       Start:  06/19/25    Expected End:  09/05/25            Patient lift 10# KB floor to plinth x 10 /s inc LB or LLE discomfort for inc tolerance to household chores (Progressing)       Start:  06/19/25    Expected End:  09/05/25            Patient complete 5TSTS < 15 sec /c inc LB or LLE discomfort indicating improved BLE functional strength and dec fall risk (Progressing)       Start:  06/19/25    Expected End:  09/05/25            Patient report achieving > 5000 steps in a day /s leading to debilitating LB or LLE discomfort indicating improved tolerance to recreational tasks  (Progressing)       Start:  06/19/25    Expected End:  09/05/25            Patient transition from seated<>supine<>prone x 2 < 30 sec /s inc LB or LLE discomfort indicating improved functional and bed mobility  (Progressing)       Start:  06/19/25    Expected End:  09/05/25            Patient report return to regular gym classes (variety of exercise themes) /s inc LB or LLE discomfort indicating improved self efficacy and tolerance to recreational activities (Progressing)       Start:  06/19/25    Expected End:  09/05/25                STG       Pt will demonstrate increased lumbar MedX ROM by at least 6 degrees from the initial ROM value with improvements noted in functional ROM and ability to perform ADLs (Progressing)       Start:  06/19/25    Expected End:  09/05/25            Pt will demonstrate increased MedX average isometric strength value by 15% from initial test resulting in improved ability to perform bending, lifting, and carrying activities safely, confidently. (Progressing)       Start:  06/19/25    Expected End:  09/05/25            Pt will report a reduction in worst pain score by 1-2 points for improved tolerance for household chores. (Progressing)       Start:  06/19/25    Expected End:  09/05/25            Pt able to perform HEP correctly with minimal cueing or supervision from therapist to encourage independent management of symptoms (Progressing)       Start:  06/19/25    Expected End:  09/05/25                  Francia Horne PTA

## 2025-07-03 NOTE — TELEPHONE ENCOUNTER
still having back pain , is physical therapy   Please is ot wanting to talk with anyone else   But Dr Beauchamp

## 2025-07-07 ENCOUNTER — INFUSION (OUTPATIENT)
Dept: INFECTIOUS DISEASES | Facility: HOSPITAL | Age: 85
End: 2025-07-07
Attending: INTERNAL MEDICINE
Payer: MEDICARE

## 2025-07-07 VITALS
DIASTOLIC BLOOD PRESSURE: 63 MMHG | RESPIRATION RATE: 18 BRPM | BODY MASS INDEX: 19.03 KG/M2 | SYSTOLIC BLOOD PRESSURE: 140 MMHG | TEMPERATURE: 98 F | OXYGEN SATURATION: 97 % | WEIGHT: 94.38 LBS | HEART RATE: 56 BPM | HEIGHT: 59 IN

## 2025-07-07 DIAGNOSIS — M81.0 AGE-RELATED OSTEOPOROSIS WITHOUT CURRENT PATHOLOGICAL FRACTURE: Primary | ICD-10-CM

## 2025-07-07 PROCEDURE — 96372 THER/PROPH/DIAG INJ SC/IM: CPT

## 2025-07-07 PROCEDURE — 63600175 PHARM REV CODE 636 W HCPCS: Mod: JZ,TB | Performed by: INTERNAL MEDICINE

## 2025-07-07 RX ADMIN — ROMOSOZUMAB-AQQG 210 MG: 105 INJECTION, SOLUTION SUBCUTANEOUS at 09:07

## 2025-07-10 ENCOUNTER — TELEPHONE (OUTPATIENT)
Dept: INTERNAL MEDICINE | Facility: CLINIC | Age: 85
End: 2025-07-10
Payer: MEDICARE

## 2025-07-10 NOTE — TELEPHONE ENCOUNTER
Called pt; pt answered    Pt reports that she is still in pain  Pt does not want to come in for an appt  Pt was upset and confused over previous follow up phone calls  Previous attempts to contact pt were unsuccessful; intent of previous calls to check in on pt's pain/follow up with pt to see if an appt was needed    Pt did not want to schedule a follow up appt

## 2025-07-10 NOTE — TELEPHONE ENCOUNTER
Copied from CRM #[id number]. Topic: General Inquiry - Status Check  >> Jul 10, 2025  8:41 AM Eleonora wrote:  Patient would like to get medical advice.  Symptoms (please be specific):   Pt states someone called her on 07/07 checking on her. Pt states she does not know who called; no name was left. Pt states she is not doing any better and is still waiting for a Rx for pain medication.   How long have you had these symptoms: on going   Would you like a call back, or a response through your [facility] portal?:   call back to pt [phone]  Pharmacy name and phone # (copy from chart):   pt did not want to give pharmacy information  Comments:

## 2025-07-15 ENCOUNTER — CLINICAL SUPPORT (OUTPATIENT)
Dept: REHABILITATION | Facility: OTHER | Age: 85
End: 2025-07-15
Payer: MEDICARE

## 2025-07-15 DIAGNOSIS — G89.29 CENTRAL SENSITIZATION TO PAIN: ICD-10-CM

## 2025-07-15 DIAGNOSIS — R29.898 DECREASED STRENGTH OF TRUNK AND BACK: ICD-10-CM

## 2025-07-15 DIAGNOSIS — Z74.09 IMPAIRED FUNCTIONAL MOBILITY, BALANCE, GAIT, AND ENDURANCE: Primary | ICD-10-CM

## 2025-07-15 PROCEDURE — 97112 NEUROMUSCULAR REEDUCATION: CPT | Mod: CQ

## 2025-07-15 PROCEDURE — 97110 THERAPEUTIC EXERCISES: CPT | Mod: CQ

## 2025-07-15 NOTE — PROGRESS NOTES
"  Outpatient Rehab    Physical Therapy Visit    Patient Name: Kathya Romero  MRN: [id number]  YOB: 1940  Encounter Date: 7/15/2025    Therapy Diagnosis:   Encounter Diagnoses   Name Primary?    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     Decreased strength of trunk and back        Physician: Carin Locke MD    Physician Orders: Eval and Treat  Preferred name "Rosita"   Medical Diagnosis: Sciatica, unspecified laterality  Surgical Diagnosis: Not applicable for this Episode   Surgical Date: Not applicable for this Episode  Days Since Last Surgery: Not applicable for this Episode    Visit # / Visits Authorized:  5 / 8  + eval Preferred name "Rosita"   Insurance Authorization Period: 6/19/2025 to 12/31/2025  Date of Evaluation: 6/19/2025   Plan of Care Expiration: 8/28/2025 6/19/2025 to 9/5/2025      PT/PTA: PTA   Number of PTA visits since last PT visit:2  Time In: 0900   Time Out: 1000  Total Time (in minutes): 60   Total Billable Time (in minutes): 30    FOTO:  Intake Score:  %  Survey Score 2:  %  Survey Score 3:  %    Precautions:          Subjective   Patient reports overall less intense symptoms with therapy but still c/o L>R LE sciatic pain. Despite reduced pain,  she reports limitations with prolonged standing due to lumbar and L shoulder girdle region pain..  Pain reported as 6/10.        Objective            Isometric Testing on Med X equipment: Testing administered by PT    Test Initial Midpoint Final   Spine area:LUMBAR      Date 06/23/25     ROM 0 - 51 deg     Max Peak Torque 78      Min Peak Torque 16      Flex/Ext Ratio 4.875     % below normative data -51     % gain from initial test Not available visit 1          5TSTS:              06/19/25:  22.95 BUE used first 2 reps, inc LLE sx    06/23/25: 26.18 hands on thighs throughout, dec controlled descent         OUTCOMES SELECTION:   Intake Outcome Measure for FOTO LUMBAR Survey   " "  Therapist reviewed FOTO scores for Snehlata D Nick on 6/19/2025.   FOTO documents entered into [employer] - see Media section.     Intake Score: 40% functional ability     Goal Score: 63% functional ability     MDC = 7 points                Treatment:  Therapeutic Exercise  TE 1: Recumbent Bike 5 min for functional endurance  TE 2: LTR 10x 5" cue to stay in non pain provoking range  TE 3: PPT x 10 5 sec hold cue for dec valsalva     + TrA destiny with ball 10x5"  TE 4: Bridges 15x  TE 5: Supine clamshells no Tband for hip mobility/dec fear of hip mobility  TE 6: Prone press ups x 10 for postural correction and mobility - pt report dec BLE sx upon completion  TE 8: GUERLINE supine HSS 15"x2-np       --GUERLINE Supine nerve glides 2x10    Peripheral muscle strengthening which included one set of 15-20 repetitions at a slow and controlled 10-13 second per rep pace focused on strengthening supporting musculature in order to improve body mechanics and functional mobility. Patient and therapist focused on proper form during treatment to ensure optimal strengthening of each targeted muscle group.  Machines utilized included:Torso rotation, Leg Ext, Leg Curl, Chest Press, and RowingTriceps, Biceps, Hip Abd, Hip Add, and Leg Press           Medical MedX Treatment as follows:  Patient received neuromuscular education for 10 minutes via participation on the Medical MedX Machine. Therapist assisted patient in isolating and engaging spinal stabilization musculature in order to improve functional ability and postural control. Patient performed exercise with therapist guidance in order to accurately use pacer function, avoid valsalva, and optimally exert effort within a safe and effective range via the Chad Exertion Rating Scale. Patient instructed to perform at a midrange of exertion and to complete 15-20 repetitions within appropriate split time, with proper technique, and while maintaining safety.            7/15/2025     9:58 AM   [facility] " Therapy   Visit Number 6   Time 5   Extension in Lying 10   Flexion in Lying 10   Lumbar Weight 30 lbs   Repetitions 15   Rating of Perceived Exertion 4   Ice - Z Lie (in min.) 5             Time Entry(in minutes):  Neuromuscular Re-Education Time Entry: 10  Therapeutic Exercise Time Entry: 20    Assessment & Plan   Assessment:   Patient presents to therapy with reports of overall reduced lumbar symptoms despite continue limitations with prolonged standing. She was instructed in therex for lumbopelvis stability and pain reduction without adverse effects. STM tolerated to L glute  with palpable tightness at piriformis region. Lumbar MedX resistance reduced to 30 lbs/ft with completion of 15 reps at 5/10 RPE with demonstration of difficulty pacing. Peripheral training completed without adverse effects despite weight adjustment required for biceps curls and leg press.   Evaluation/Treatment Tolerance: Patient tolerated treatment well      The patient will continue to benefit from skilled outpatient physical therapy in order to address the deficits listed in the problem list on the initial evaluation, provide patient and family education, and maximize the patients level of independence in the home and community environments.     The patient's spiritual, cultural, and educational needs were considered, and the patient is agreeable to the plan of care and goals.           Plan: Cont PT per POC.  Attention to PNE messaging to improve pt self efficacy    Goals:   Active       LTG       Pt will demonstrate increased lumbar MedX ROM by at least 9 degrees from initial ROM value, resulting in improved ability to perform functional forward bending while standing and sitting. (Progressing)       Start:  06/19/25    Expected End:  09/05/25            Pt will demonstrate increased MedX average isometric strength value by 20% from initial test resulting in improved ability to perform bending, lifting, and carrying activities safely  and confidently.  (Progressing)       Start:  06/19/25    Expected End:  09/05/25            Pt to demonstrate ability to independently control and reduce their pain through posture positioning and mechanical movements throughout a typical day. (Progressing)       Start:  06/19/25    Expected End:  09/05/25            Pt will demonstrate reduced pain and improved functional outcomes as reported on the FOTO by reaching an intake score of >/= 63% functional ability in order to demonstrate subjective improvement in patient's condition.  (Progressing)       Start:  06/19/25    Expected End:  09/05/25            Pt will demonstrate independence with the HEP at discharge. (Progressing)       Start:  06/19/25    Expected End:  09/05/25               Patient specific functional goals        Patient ascend/descend 2 flights of stairs /c inc LB discomfort /c or /s handrails in < 60 sec for improved access in home (Progressing)       Start:  06/19/25    Expected End:  09/05/25            Patient lift 10# KB floor to plinth x 10 /s inc LB or LLE discomfort for inc tolerance to household chores (Progressing)       Start:  06/19/25    Expected End:  09/05/25            Patient complete 5TSTS < 15 sec /c inc LB or LLE discomfort indicating improved BLE functional strength and dec fall risk (Progressing)       Start:  06/19/25    Expected End:  09/05/25            Patient report achieving > 5000 steps in a day /s leading to debilitating LB or LLE discomfort indicating improved tolerance to recreational tasks  (Progressing)       Start:  06/19/25    Expected End:  09/05/25            Patient transition from seated<>supine<>prone x 2 < 30 sec /s inc LB or LLE discomfort indicating improved functional and bed mobility  (Progressing)       Start:  06/19/25    Expected End:  09/05/25            Patient report return to regular gym classes (variety of exercise themes) /s inc LB or LLE discomfort indicating improved self efficacy and  tolerance to recreational activities (Progressing)       Start:  06/19/25    Expected End:  09/05/25               STG       Pt will demonstrate increased lumbar MedX ROM by at least 6 degrees from the initial ROM value with improvements noted in functional ROM and ability to perform ADLs (Progressing)       Start:  06/19/25    Expected End:  09/05/25            Pt will demonstrate increased MedX average isometric strength value by 15% from initial test resulting in improved ability to perform bending, lifting, and carrying activities safely, confidently. (Progressing)       Start:  06/19/25    Expected End:  09/05/25            Pt will report a reduction in worst pain score by 1-2 points for improved tolerance for household chores. (Progressing)       Start:  06/19/25    Expected End:  09/05/25            Pt able to perform HEP correctly with minimal cueing or supervision from therapist to encourage independent management of symptoms (Progressing)       Start:  06/19/25    Expected End:  09/05/25                  Francia Horne PTA

## 2025-07-21 ENCOUNTER — CLINICAL SUPPORT (OUTPATIENT)
Dept: REHABILITATION | Facility: OTHER | Age: 85
End: 2025-07-21
Payer: MEDICARE

## 2025-07-21 DIAGNOSIS — R29.898 DECREASED STRENGTH OF TRUNK AND BACK: ICD-10-CM

## 2025-07-21 DIAGNOSIS — G89.29 CENTRAL SENSITIZATION TO PAIN: ICD-10-CM

## 2025-07-21 DIAGNOSIS — Z74.09 IMPAIRED FUNCTIONAL MOBILITY, BALANCE, GAIT, AND ENDURANCE: Primary | ICD-10-CM

## 2025-07-21 PROCEDURE — 97112 NEUROMUSCULAR REEDUCATION: CPT | Mod: CQ

## 2025-07-21 PROCEDURE — 97110 THERAPEUTIC EXERCISES: CPT | Mod: CQ

## 2025-07-21 NOTE — PROGRESS NOTES
"  Outpatient Rehab    Physical Therapy Visit    Patient Name: Kathya Romero  MRN: [id number]  YOB: 1940  Encounter Date: 7/21/2025    Therapy Diagnosis:   Encounter Diagnoses   Name Primary?    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     Decreased strength of trunk and back        Physician: Carin Locke MD    Physician Orders: Eval and Treat  Preferred name "Rosita"   Medical Diagnosis: Sciatica, unspecified laterality  Surgical Diagnosis: Not applicable for this Episode   Surgical Date: Not applicable for this Episode  Days Since Last Surgery: Not applicable for this Episode    Visit # / Visits Authorized:  6 / 8 + eval Preferred name "Rosita"   Insurance Authorization Period: 6/19/2025 to 12/31/2025  Date of Evaluation: 6/19/2025   Plan of Care Expiration: 8/28/2025 6/19/2025 to 9/5/2025      PT/PTA: PTA   Number of PTA visits since last PT visit:3  Time In: 1330   Time Out: 1430  Total Time (in minutes): 60   Total Billable Time (in minutes): 55    FOTO:  Intake Score:  %  Survey Score 2:  %  Survey Score 3:  %    Precautions:          Subjective   Patient reports overall less intense symptoms with therapy but still c/o L>R lumbar soreness. Despite reduced pain, she reports limitations with prolonged standing due to lumbar and L shoulder girdle region pain. Also, she reports occasional L foot burning that interferes with her rest when experienced..  Pain reported as 6/10.        Objective            Isometric Testing on Med X equipment: Testing administered by PT    Test Initial Midpoint Final   Spine area:LUMBAR      Date 06/23/25     ROM 0 - 51 deg     Max Peak Torque 78      Min Peak Torque 16      Flex/Ext Ratio 4.875     % below normative data -51     % gain from initial test Not available visit 1          5TSTS:              06/19/25:  22.95 BUE used first 2 reps, inc LLE sx    06/23/25: 26.18 hands on thighs throughout, dec " "controlled descent         OUTCOMES SELECTION:   Intake Outcome Measure for FOTO LUMBAR Survey     Therapist reviewed FOTO scores for Kathya Romero on 6/19/2025.   FOTO documents entered into [employer] - see Media section.     Intake Score: 40% functional ability     Goal Score: 63% functional ability     MDC = 7 points                Treatment:  Therapeutic Exercise  TE 1: Recumbent Bike 5 min for functional endurance  TE 2: LTR 10x 5" cue to stay in non pain provoking range  TE 3: PPT x 10 5 sec hold cue for dec valsalva     + TrA destiny with ball 10x5"  TE 4: Bridges 15x  TE 5: Supine clamshells no Tband for hip mobility/dec fear of hip mobility  TE 6: Prone press ups x 10 for postural correction and mobility - pt report dec BLE sx upon completion  TE 7: Supine clamshells x15  Y Tband  TE 8: GUERLINE supine HSS 15"x2-      --GUERLINE Supine nerve glides 2x10    Peripheral muscle strengthening which included one set of 15-20 repetitions at a slow and controlled 10-13 second per rep pace focused on strengthening supporting musculature in order to improve body mechanics and functional mobility. Patient and therapist focused on proper form during treatment to ensure optimal strengthening of each targeted muscle group.  Machines utilized included:Torso rotation, Leg Ext, Leg Curl, Chest Press, and RowingTriceps, Biceps, Hip Abd, Hip Add, and Leg Press           Medical MedX Treatment as follows:  Patient received neuromuscular education for 10 minutes via participation on the Medical MedX Machine. Therapist assisted patient in isolating and engaging spinal stabilization musculature in order to improve functional ability and postural control. Patient performed exercise with therapist guidance in order to accurately use pacer function, avoid valsalva, and optimally exert effort within a safe and effective range via the Chad Exertion Rating Scale. Patient instructed to perform at a midrange of exertion and to complete 15-20 " repetitions within appropriate split time, with proper technique, and while maintaining safety.            7/21/2025     2:21 PM   [facility]   Visit Number 7   VAS Pain Rating 6   Time 5   Extension in Lying 10   Flexion in Lying 10   Lumbar Weight 30 lbs   Repetitions 20   Rating of Perceived Exertion 4   Ice - Z Lie (in min.) 0                   Time Entry(in minutes):  Neuromuscular Re-Education Time Entry: 10  Therapeutic Exercise Time Entry: 45    Assessment & Plan   Assessment:    Patient presents to therapy with reports of overall reduced lumbar symptoms despite continue limitations with prolonged standing. She was instructed in therex for lumbopelvis stability and pain reduction without adverse effects.  Lumbar MedX resistance of 30 lbs/ft with completion of 20 reps at 4/10 RPE with demonstration of improved pacing. Peripheral training completed without adverse effects despite weight adjustment required for biceps curls and leg press.    Evaluation/Treatment Tolerance: Patient tolerated treatment well      The patient will continue to benefit from skilled outpatient physical therapy in order to address the deficits listed in the problem list on the initial evaluation, provide patient and family education, and maximize the patients level of independence in the home and community environments.     The patient's spiritual, cultural, and educational needs were considered, and the patient is agreeable to the plan of care and goals.           Plan: Cont PT per POC.  Attention to PNE messaging to improve pt self efficacy    Goals:   Active       LTG       Pt will demonstrate increased lumbar MedX ROM by at least 9 degrees from initial ROM value, resulting in improved ability to perform functional forward bending while standing and sitting. (Progressing)       Start:  06/19/25    Expected End:  09/05/25            Pt will demonstrate increased MedX average isometric strength value by 20% from initial test  resulting in improved ability to perform bending, lifting, and carrying activities safely and confidently.  (Progressing)       Start:  06/19/25    Expected End:  09/05/25            Pt to demonstrate ability to independently control and reduce their pain through posture positioning and mechanical movements throughout a typical day. (Progressing)       Start:  06/19/25    Expected End:  09/05/25            Pt will demonstrate reduced pain and improved functional outcomes as reported on the FOTO by reaching an intake score of >/= 63% functional ability in order to demonstrate subjective improvement in patient's condition.  (Progressing)       Start:  06/19/25    Expected End:  09/05/25            Pt will demonstrate independence with the HEP at discharge. (Progressing)       Start:  06/19/25    Expected End:  09/05/25               Patient specific functional goals        Patient ascend/descend 2 flights of stairs /c inc LB discomfort /c or /s handrails in < 60 sec for improved access in home (Progressing)       Start:  06/19/25    Expected End:  09/05/25            Patient lift 10# KB floor to plinth x 10 /s inc LB or LLE discomfort for inc tolerance to household chores (Progressing)       Start:  06/19/25    Expected End:  09/05/25            Patient complete 5TSTS < 15 sec /c inc LB or LLE discomfort indicating improved BLE functional strength and dec fall risk (Progressing)       Start:  06/19/25    Expected End:  09/05/25            Patient report achieving > 5000 steps in a day /s leading to debilitating LB or LLE discomfort indicating improved tolerance to recreational tasks  (Progressing)       Start:  06/19/25    Expected End:  09/05/25            Patient transition from seated<>supine<>prone x 2 < 30 sec /s inc LB or LLE discomfort indicating improved functional and bed mobility  (Progressing)       Start:  06/19/25    Expected End:  09/05/25            Patient report return to regular gym classes (variety  of exercise themes) /s inc LB or LLE discomfort indicating improved self efficacy and tolerance to recreational activities (Progressing)       Start:  06/19/25    Expected End:  09/05/25               STG       Pt will demonstrate increased lumbar MedX ROM by at least 6 degrees from the initial ROM value with improvements noted in functional ROM and ability to perform ADLs (Progressing)       Start:  06/19/25    Expected End:  09/05/25            Pt will demonstrate increased MedX average isometric strength value by 15% from initial test resulting in improved ability to perform bending, lifting, and carrying activities safely, confidently. (Progressing)       Start:  06/19/25    Expected End:  09/05/25            Pt will report a reduction in worst pain score by 1-2 points for improved tolerance for household chores. (Progressing)       Start:  06/19/25    Expected End:  09/05/25            Pt able to perform HEP correctly with minimal cueing or supervision from therapist to encourage independent management of symptoms (Progressing)       Start:  06/19/25    Expected End:  09/05/25                  Francia Horne, PTA

## 2025-07-24 ENCOUNTER — CLINICAL SUPPORT (OUTPATIENT)
Dept: REHABILITATION | Facility: OTHER | Age: 85
End: 2025-07-24
Payer: MEDICARE

## 2025-07-24 DIAGNOSIS — G89.29 CENTRAL SENSITIZATION TO PAIN: ICD-10-CM

## 2025-07-24 DIAGNOSIS — Z74.09 IMPAIRED FUNCTIONAL MOBILITY, BALANCE, GAIT, AND ENDURANCE: Primary | ICD-10-CM

## 2025-07-24 DIAGNOSIS — R29.898 DECREASED STRENGTH OF TRUNK AND BACK: ICD-10-CM

## 2025-07-24 PROCEDURE — 97112 NEUROMUSCULAR REEDUCATION: CPT | Mod: CQ

## 2025-07-24 PROCEDURE — 97110 THERAPEUTIC EXERCISES: CPT | Mod: CQ

## 2025-07-24 NOTE — PROGRESS NOTES
"  Outpatient Rehab    Physical Therapy Visit    Patient Name: Kathya Romero  MRN: [id number]  YOB: 1940  Encounter Date: 7/24/2025    Therapy Diagnosis:   Encounter Diagnoses   Name Primary?    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     Decreased strength of trunk and back        Physician: Carin Locke MD    Physician Orders: Eval and Treat  Preferred name "Rosita"   Medical Diagnosis: Sciatica, unspecified laterality  Surgical Diagnosis: Not applicable for this Episode   Surgical Date: Not applicable for this Episode  Days Since Last Surgery: Not applicable for this Episode    Visit # / Visits Authorized:  7 / 8  + eval Preferred name "Rosita"   Insurance Authorization Period: 6/19/2025 to 12/31/2025  Date of Evaluation: 6/19/2025   Plan of Care Expiration: 8/28/2025 6/19/2025 to 9/5/2025      PT/PTA: PTA   Number of PTA visits since last PT visit:4  Time In: 1400   Time Out: 1500  Total Time (in minutes): 60   Total Billable Time (in minutes): 30    FOTO:  Intake Score:  %  Survey Score 2:  %  Survey Score 3:  %    Precautions:          Subjective   Patient reports overall less intense symptoms with therapy but still c/o lumbar pain..  Pain reported as 5/10.        Objective            Isometric Testing on Med X equipment: Testing administered by PT    Test Initial Midpoint Final   Spine area:LUMBAR      Date 06/23/25     ROM 0 - 51 deg     Max Peak Torque 78      Min Peak Torque 16      Flex/Ext Ratio 4.875     % below normative data -51     % gain from initial test Not available visit 1          5TSTS:              06/19/25:  22.95 BUE used first 2 reps, inc LLE sx    06/23/25: 26.18 hands on thighs throughout, dec controlled descent         OUTCOMES SELECTION:   Intake Outcome Measure for FOTO LUMBAR Survey     Therapist reviewed FOTO scores for Kathya Romero on 6/19/2025.   FOTO documents entered into [employer] - see Media section.   " "  Intake Score: 40% functional ability     Goal Score: 63% functional ability     MDC = 7 points                Treatment:  Therapeutic Exercise  TE 2: LTR 10x 5" cue to stay in non pain provoking range  TE 3: PPT x 10 5 sec hold cue for dec valsalva     + TrA destiny with ball 10x5"  TE 4: Bridges 15x  TE 6: Prone press ups x 10 for postural correction and mobility - pt report dec BLE sx upon completion  TE 8: GUERLINE supine HSS 15"x2-      --GUERLINE Supine nerve glides 2x10    Peripheral muscle strengthening which included one set of 15-20 repetitions at a slow and controlled 10-13 second per rep pace focused on strengthening supporting musculature in order to improve body mechanics and functional mobility. Patient and therapist focused on proper form during treatment to ensure optimal strengthening of each targeted muscle group.  Machines utilized included:Torso rotation, Leg Ext, Leg Curl, Chest Press, and RowingTriceps, Biceps, Hip Abd, Hip Add, and Leg Press           Medical MedX Treatment as follows:  Patient received neuromuscular education for 10 minutes via participation on the Medical MedX Machine. Therapist assisted patient in isolating and engaging spinal stabilization musculature in order to improve functional ability and postural control. Patient performed exercise with therapist guidance in order to accurately use pacer function, avoid valsalva, and optimally exert effort within a safe and effective range via the Chad Exertion Rating Scale. Patient instructed to perform at a midrange of exertion and to complete 15-20 repetitions within appropriate split time, with proper technique, and while maintaining safety.            7/24/2025     3:47 PM   [facility]   Visit Number 8   VAS Pain Rating 5   Time 5   Extension in Lying 10   Flexion in Lying 10   Lumbar Weight 33 lbs   Repetitions 15   Rating of Perceived Exertion 4   Ice - Z Lie (in min.) 0                       Time Entry(in " minutes):  Neuromuscular Re-Education Time Entry: 10  Therapeutic Exercise Time Entry: 20    Assessment & Plan   Assessment:     Patient presents to therapy with reports of overall reduced lumbar symptoms. She was instructed in therex for lumbopelvis stability and pain reduction without adverse effects, requiring cues during prone press ups to promote sagging at hips. Lumbar MedX resistance increased to 33 lbs/ft with completion of 15 reps at 4/10 RPE, demonstrating improved pacing. Peripheral training completed without adverse effects despite weight adjustment required for biceps curls and leg press.     Evaluation/Treatment Tolerance: Patient tolerated treatment well      The patient will continue to benefit from skilled outpatient physical therapy in order to address the deficits listed in the problem list on the initial evaluation, provide patient and family education, and maximize the patients level of independence in the home and community environments.     The patient's spiritual, cultural, and educational needs were considered, and the patient is agreeable to the plan of care and goals.           Plan: Cont PT per POC.  Attention to PNE messaging to improve pt self efficacy    Goals:   Active       LTG       Pt will demonstrate increased lumbar MedX ROM by at least 9 degrees from initial ROM value, resulting in improved ability to perform functional forward bending while standing and sitting. (Progressing)       Start:  06/19/25    Expected End:  09/05/25            Pt will demonstrate increased MedX average isometric strength value by 20% from initial test resulting in improved ability to perform bending, lifting, and carrying activities safely and confidently.  (Progressing)       Start:  06/19/25    Expected End:  09/05/25            Pt to demonstrate ability to independently control and reduce their pain through posture positioning and mechanical movements throughout a typical day. (Progressing)        Start:  06/19/25    Expected End:  09/05/25            Pt will demonstrate reduced pain and improved functional outcomes as reported on the FOTO by reaching an intake score of >/= 63% functional ability in order to demonstrate subjective improvement in patient's condition.  (Progressing)       Start:  06/19/25    Expected End:  09/05/25            Pt will demonstrate independence with the HEP at discharge. (Progressing)       Start:  06/19/25    Expected End:  09/05/25               Patient specific functional goals        Patient ascend/descend 2 flights of stairs /c inc LB discomfort /c or /s handrails in < 60 sec for improved access in home (Progressing)       Start:  06/19/25    Expected End:  09/05/25            Patient lift 10# KB floor to plinth x 10 /s inc LB or LLE discomfort for inc tolerance to household chores (Progressing)       Start:  06/19/25    Expected End:  09/05/25            Patient complete 5TSTS < 15 sec /c inc LB or LLE discomfort indicating improved BLE functional strength and dec fall risk (Progressing)       Start:  06/19/25    Expected End:  09/05/25            Patient report achieving > 5000 steps in a day /s leading to debilitating LB or LLE discomfort indicating improved tolerance to recreational tasks  (Progressing)       Start:  06/19/25    Expected End:  09/05/25            Patient transition from seated<>supine<>prone x 2 < 30 sec /s inc LB or LLE discomfort indicating improved functional and bed mobility  (Progressing)       Start:  06/19/25    Expected End:  09/05/25            Patient report return to regular gym classes (variety of exercise themes) /s inc LB or LLE discomfort indicating improved self efficacy and tolerance to recreational activities (Progressing)       Start:  06/19/25    Expected End:  09/05/25               STG       Pt will demonstrate increased lumbar MedX ROM by at least 6 degrees from the initial ROM value with improvements noted in functional ROM and  ability to perform ADLs (Progressing)       Start:  06/19/25    Expected End:  09/05/25            Pt will demonstrate increased MedX average isometric strength value by 15% from initial test resulting in improved ability to perform bending, lifting, and carrying activities safely, confidently. (Progressing)       Start:  06/19/25    Expected End:  09/05/25            Pt will report a reduction in worst pain score by 1-2 points for improved tolerance for household chores. (Progressing)       Start:  06/19/25    Expected End:  09/05/25            Pt able to perform HEP correctly with minimal cueing or supervision from therapist to encourage independent management of symptoms (Progressing)       Start:  06/19/25    Expected End:  09/05/25                  Francia Horne PTA

## 2025-07-28 ENCOUNTER — CLINICAL SUPPORT (OUTPATIENT)
Dept: REHABILITATION | Facility: OTHER | Age: 85
End: 2025-07-28
Payer: MEDICARE

## 2025-07-28 DIAGNOSIS — R29.898 DECREASED STRENGTH OF TRUNK AND BACK: ICD-10-CM

## 2025-07-28 DIAGNOSIS — G89.29 CENTRAL SENSITIZATION TO PAIN: ICD-10-CM

## 2025-07-28 DIAGNOSIS — Z00.00 ENCOUNTER FOR MEDICARE ANNUAL WELLNESS EXAM: ICD-10-CM

## 2025-07-28 DIAGNOSIS — Z74.09 IMPAIRED FUNCTIONAL MOBILITY, BALANCE, GAIT, AND ENDURANCE: Primary | ICD-10-CM

## 2025-07-28 PROCEDURE — 97112 NEUROMUSCULAR REEDUCATION: CPT

## 2025-07-28 PROCEDURE — 97110 THERAPEUTIC EXERCISES: CPT

## 2025-07-28 NOTE — PROGRESS NOTES
"    Outpatient Rehab    Physical Therapy Progress Note      Patient Name: Kathya Romero  MRN: [id number]  YOB: 1940  Encounter Date: 7/28/2025    Therapy Diagnosis:   Encounter Diagnoses   Name Primary?    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     Decreased strength of trunk and back        Physician: Carin Locke MD    Physician Orders: Eval and Treat  Preferred name "Rosita"   Medical Diagnosis: Sciatica, unspecified laterality  Surgical Diagnosis: Not applicable for this Episode   Surgical Date: Not applicable for this Episode  Days Since Last Surgery: Not applicable for this Episode    Visit # / Visits Authorized:  9/8 (pending)  + eval Preferred name "Rosita"   Insurance Authorization Period: 6/19/2025 to 12/31/2025  Date of Evaluation: 6/19/2025   Plan of Care Expiration:  6/19/2025 to 9/5/2025       PT/PTA: PT   Number of PTA visits since last PT visit:4   Time In:     Time Out:    Total Time (in minutes):     Total Billable Time (in minutes):      FOTO:  Intake Score:  %  Survey Score 2:  %  Survey Score 3:  %    Precautions:          Subjective   Patient reports pain intensity has dec since beginning HB.  Patient report this has allowed her to inc her walking tolerance noting at times reaching 5k steps.  However, patient report often needing to take a rest the next day.  Patient report feeling dec pain /c step use in house but still slight inc /c use.   Patient also feels like bed mobility has improved..  Pain reported as 5/10.        Objective            Isometric Testing on Med X equipment: Testing administered by PT    Test Initial Midpoint Final   Spine area:LUMBAR      Date 06/23/25 07/28/25    ROM 0 - 51 deg 0 - 54 deg    Max Peak Torque 78  83    Min Peak Torque 16  23    Flex/Ext Ratio 4.875 3.6    % below normative data -51 -43%    % gain from initial test Not available visit 1 23         5TSTS:              06/19/25:  22.95 " "BUE used first 2 reps, inc LLE sx    06/23/25: 26.18 hands on thighs throughout, dec controlled descent    07/2825: 17.36 hands on thights throughout, no inc discomfort         OUTCOMES SELECTION:   Intake Outcome Measure for FOTO LUMBAR Survey     Therapist reviewed FOTO scores for ehlachaim Romero on 6/19/2025.   FOTO documents entered into [employer] - see Media section.     Intake Score: 40% functional ability  Visit 6:  NP   07/28/25: 49%     Goal Score: 63% functional ability     MDC = 7 points                   Treatment:  Therapeutic Exercise  TE 1: Recumbent Bike 5 min for functional endurance  TE 2: LTR 10x 5" cue to stay in non pain provoking range  TE 3: PPT x 10 5 sec hold cue for dec valsalva  TE 4: HS stretch 2 x 30 sec hold each side  TE 5: Bridges 2 x 10  TE 6: Prone press ups x 10 for postural correction and mobility  TE 10: Peripheral muscle strengthening which included one set of 15-20 repetitions at a slow and controlled 10-13 second per rep pace focused on strengthening supporting musculature in order to improve body mechanics and functional mobility. Patient and therapist focused on proper form during treatment to ensure optimal strengthening of each targeted muscle group.  Machines utilized included:Torso rotation, Leg Ext, Leg Curl, Chest Press, and RowingTriceps, Biceps, Hip Abd, , and Leg Press  Balance/Neuromuscular Re-Education  NMR 1: Lumbar MedX Testing (see below)    MedX testing: Patient  received neuromuscular education to engage spinal musculature correctly for motor control and engagement of musculature including the MedX exercise component and practice and standard testing. MedX dynamic exercise and baseline isometric test performed with instructions to guide the patient safely through the testing procedure. Patient instructed to perform isometric test correctly and safely while building to an optimal force with a pain-free effort. Patient also instructed that they should feel " support/pressure from MedX restraints but no pain/discomfort, and encouraged to report any pain to therapist. Patient demonstrated appropriate understanding of information and tolerance of test.  Education regarding purpose of test, safety during test given, and reviewed possible more soreness and strategies.              7/28/2025     9:27 AM   [facility]   Visit Number 9   VAS Pain Rating 5   Time 5   Extension in Lying 15   Lumbar Extension Seat Pad 3   Femur Restraint 7   Top Dead Center 24   Counterweight 200   Lumbar Flexion 54   Lumbar Extension 0   Lumbar Peak Torque 83 ft. lbs.   Min Torque 23   Test Percent Below Normative Data 43 %   Test Percent Gain in Strength from Initial  23 %   Lumbar Weight 33 lbs   Repetitions 5   Ice - Z Lie (in min.) 5          Time Entry(in minutes):       Assessment & Plan   Assessment: Patient has attended 9 visits at [facility] which included MD evaluation, PT evaluation with isometric testing, and physical therapy treatment including HEP instruction, education, aerobic activity, dynamic strengthening on MedX equipment for the spine, and whole body strengthening on MedX equipment with increasing resistance. Patient demonstrates increased ability to reduce symptoms, improve posture, improve ROM, and improve strength, as stated below: -Improved postural recognition performing press ups to inc mobility.  Patient able to demonstrate HEP /c ease meeting STG.  Recommend inc HEP intensity next visit to progress patient exercise tolerance and inc self efficacy.   -Improved lumbar ROM, initially on MedX test 0 -51 deg and currently 0 - 54 deg showing progress toward associated goal. -Improved strength on lumbar MedX isometric test with 23% average inc vs eval and meeting STG.  However, patient remains 43% below norms indicating appropriateness of continue HB programming.   -Initial FOTO score 40 and current score 49 indicating  reduced pain, improved function,  mirroring patient subjective report and meeting MDC for change.  Evaluation/Treatment Tolerance: Patient tolerated treatment well      The patient will continue to benefit from skilled outpatient physical therapy in order to address the deficits listed in the problem list on the initial evaluation, provide patient and family education, and maximize the patients level of independence in the home and community environments.     The patient's spiritual, cultural, and educational needs were considered, and the patient is agreeable to the plan of care and goals.           Plan: Cont PT per POC.  Attention to PNE messaging to improve pt self efficacy    Goals:   Active       LTG       Pt will demonstrate increased lumbar MedX ROM by at least 9 degrees from initial ROM value, resulting in improved ability to perform functional forward bending while standing and sitting. (Progressing)       Start:  06/19/25    Expected End:  09/05/25            Pt will demonstrate increased MedX average isometric strength value by 20% from initial test resulting in improved ability to perform bending, lifting, and carrying activities safely and confidently.  (Progressing)       Start:  06/19/25    Expected End:  09/05/25            Pt to demonstrate ability to independently control and reduce their pain through posture positioning and mechanical movements throughout a typical day. (Progressing)       Start:  06/19/25    Expected End:  09/05/25            Pt will demonstrate reduced pain and improved functional outcomes as reported on the FOTO by reaching an intake score of >/= 63% functional ability in order to demonstrate subjective improvement in patient's condition.  (Progressing)       Start:  06/19/25    Expected End:  09/05/25            Pt will demonstrate independence with the HEP at discharge. (Progressing)       Start:  06/19/25    Expected End:  09/05/25               Patient specific functional goals        Patient  ascend/descend 2 flights of stairs /c inc LB discomfort /c or /s handrails in < 60 sec for improved access in home (Progressing)       Start:  06/19/25    Expected End:  09/05/25            Patient lift 10# KB floor to plinth x 10 /s inc LB or LLE discomfort for inc tolerance to household chores (Progressing)       Start:  06/19/25    Expected End:  09/05/25            Patient complete 5TSTS < 15 sec /c inc LB or LLE discomfort indicating improved BLE functional strength and dec fall risk (Progressing)       Start:  06/19/25    Expected End:  09/05/25            Patient report achieving > 5000 steps in a day /s leading to debilitating LB or LLE discomfort indicating improved tolerance to recreational tasks  (Progressing)       Start:  06/19/25    Expected End:  09/05/25            Patient transition from seated<>supine<>prone x 2 < 30 sec /s inc LB or LLE discomfort indicating improved functional and bed mobility  (Progressing)       Start:  06/19/25    Expected End:  09/05/25            Patient report return to regular gym classes (variety of exercise themes) /s inc LB or LLE discomfort indicating improved self efficacy and tolerance to recreational activities (Progressing)       Start:  06/19/25    Expected End:  09/05/25               STG       Pt will demonstrate increased lumbar MedX ROM by at least 6 degrees from the initial ROM value with improvements noted in functional ROM and ability to perform ADLs (Progressing)       Start:  06/19/25    Expected End:  09/05/25            Pt will demonstrate increased MedX average isometric strength value by 15% from initial test resulting in improved ability to perform bending, lifting, and carrying activities safely, confidently. (Met)       Start:  06/19/25    Expected End:  09/05/25    Resolved:  07/28/25         Pt will report a reduction in worst pain score by 1-2 points for improved tolerance for household chores. (Met)       Start:  06/19/25    Expected End:   09/05/25    Resolved:  07/28/25         Pt able to perform HEP correctly with minimal cueing or supervision from therapist to encourage independent management of symptoms (Met)       Start:  06/19/25    Expected End:  09/05/25    Resolved:  07/28/25                 Prabhakar Kelley, PT

## 2025-07-31 ENCOUNTER — OFFICE VISIT (OUTPATIENT)
Dept: INTERNAL MEDICINE | Facility: CLINIC | Age: 85
End: 2025-07-31
Payer: MEDICARE

## 2025-07-31 ENCOUNTER — CLINICAL SUPPORT (OUTPATIENT)
Dept: REHABILITATION | Facility: OTHER | Age: 85
End: 2025-07-31
Payer: MEDICARE

## 2025-07-31 VITALS — BODY MASS INDEX: 19.38 KG/M2 | OXYGEN SATURATION: 99 % | HEART RATE: 61 BPM | WEIGHT: 96.13 LBS | HEIGHT: 59 IN

## 2025-07-31 DIAGNOSIS — M81.0 AGE-RELATED OSTEOPOROSIS WITHOUT CURRENT PATHOLOGICAL FRACTURE: ICD-10-CM

## 2025-07-31 DIAGNOSIS — Z74.09 IMPAIRED FUNCTIONAL MOBILITY, BALANCE, GAIT, AND ENDURANCE: Primary | ICD-10-CM

## 2025-07-31 DIAGNOSIS — R29.898 DECREASED STRENGTH OF TRUNK AND BACK: ICD-10-CM

## 2025-07-31 DIAGNOSIS — I48.0 PAROXYSMAL ATRIAL FIBRILLATION: ICD-10-CM

## 2025-07-31 DIAGNOSIS — M54.16 LUMBAR RADICULOPATHY: Primary | ICD-10-CM

## 2025-07-31 DIAGNOSIS — G89.29 CENTRAL SENSITIZATION TO PAIN: ICD-10-CM

## 2025-07-31 DIAGNOSIS — I10 ESSENTIAL HYPERTENSION: ICD-10-CM

## 2025-07-31 PROCEDURE — 99214 OFFICE O/P EST MOD 30 MIN: CPT | Mod: S$PBB,,, | Performed by: STUDENT IN AN ORGANIZED HEALTH CARE EDUCATION/TRAINING PROGRAM

## 2025-07-31 PROCEDURE — 97112 NEUROMUSCULAR REEDUCATION: CPT | Mod: CQ

## 2025-07-31 PROCEDURE — 97110 THERAPEUTIC EXERCISES: CPT | Mod: CQ

## 2025-07-31 PROCEDURE — 99999 PR PBB SHADOW E&M-EST. PATIENT-LVL III: CPT | Mod: PBBFAC,,, | Performed by: STUDENT IN AN ORGANIZED HEALTH CARE EDUCATION/TRAINING PROGRAM

## 2025-07-31 PROCEDURE — 99213 OFFICE O/P EST LOW 20 MIN: CPT | Mod: PBBFAC | Performed by: STUDENT IN AN ORGANIZED HEALTH CARE EDUCATION/TRAINING PROGRAM

## 2025-07-31 RX ORDER — CELECOXIB 200 MG/1
200 CAPSULE ORAL 2 TIMES DAILY PRN
COMMUNITY

## 2025-07-31 RX ORDER — MUPIROCIN 20 MG/G
OINTMENT TOPICAL 3 TIMES DAILY
COMMUNITY
Start: 2025-04-08

## 2025-07-31 RX ORDER — ACETAMINOPHEN 500 MG
TABLET ORAL
COMMUNITY

## 2025-07-31 RX ORDER — ASCORBIC ACID 125 MG
TABLET,CHEWABLE ORAL
COMMUNITY

## 2025-07-31 RX ORDER — SIMVASTATIN 5 MG/1
TABLET, FILM COATED ORAL
COMMUNITY

## 2025-07-31 RX ORDER — PSYLLIUM HUSK 0.4 G
CAPSULE ORAL
COMMUNITY

## 2025-07-31 RX ORDER — MELOXICAM 15 MG/1
7.5 TABLET ORAL
COMMUNITY
Start: 2025-06-12 | End: 2025-07-31

## 2025-07-31 RX ORDER — CELECOXIB 100 MG/1
CAPSULE ORAL
COMMUNITY

## 2025-07-31 NOTE — PROGRESS NOTES
[facility]    Subjective:    Patient ID: Kathya Romero is a 84 y.o. female.    History of Present Illness    CHIEF COMPLAINT:  Patient presents today for annual checkup    BACK PAIN AND SCIATICA:  She reports chronic back pain with radicular symptoms originating in the lower back and radiating to the buttocks and extending down to the toes. She initially experienced significant morning mobility difficulties, finding it challenging to walk upon rising from bed. She has a known diagnosis of back arthritis confirmed by a previous medical provider. She has been receiving physical therapy for nine weeks with gradual improvement in symptoms and reduced morning walking difficulties compared to initial presentation. The pain previously affected both legs. She is currently taking gabapentin 3 tablets daily for sciatica-related leg pain and reports feeling tired after medication initiation, with dosage recently adjusted by her provider.    ATRIAL FIBRILLATION:  She has a history of atrial fibrillation and associated palpitations. She denies history of heart attack, stroke, or heart failure. Recent cardiac monitoring with her cardiologist showed improvement with overall better condition. She is currently managed with Metoprolol twice daily to control heart rate and atrial fibrillation symptoms.    OSTEOPOROSIS:  She has ongoing management of osteoporosis with injection therapy under the care of Dr. Mckeon. She currently receives periodic osteoporosis injections as part of her treatment plan.    CURRENT MEDICATIONS:  She is currently taking Metoprolol twice daily, Eliquis (apixaban) twice daily, Amlodipine 2 tablets daily, Losartan 12.5 mg, Atorvastatin for cholesterol management, and Gabapentin 3 tablets daily for sciatica-related leg pain.    LABS:  Sodium level was noted to be low. Vitamin B12 level was significantly elevated, with recommendation from Dr. Pate to discontinue supplementation due to  high levels.       Assessment and Plan:      1. Lumbar radiculopathy    2. Essential hypertension    3. Paroxysmal atrial fibrillation    4. Age-related osteoporosis without current pathological fracture            Dictated Impression/Plan:  Start impression seen to establish care.  Wishes to transition primary care to [facility] since this is where her other providers are located.  Her blood pressure was very high today which is unusual for her.  She was completely asymptomatic.  She will check this at home today and tomorrow and let me know if it remains elevated.  She will also follow up with Cardiology next month as scheduled.     She is otherwise doing well.  We reviewed her medical history as above.  Briefly reviewed: reports sciatica is improving with physical therapy.  On anticoagulation and rate control for Afib.  Adherent to her antihypertensives.  She is not in need of any refills today.  Follow up in about 6 months or sooner as needed.      Assessment & Plan    PLAN SUMMARY:  Continue current medication regimen, including Eliquis (apixaban) and Metoprolol  Prescribe Amlodipine, dosage to be confirmed with cardiologist  Prescribe Gabapentin for nerve pain  Continue atorvastatin for cholesterol management with adjusted dosage  Continue physical therapy for back pain and lumbar radiculopathy  Administer injections for osteoporosis management  Patient to monitor BP at home daily and record readings  Repeat BP measurement after patient rests  Follow up with cardiologist on the 19th to discuss Amlodipine dosage and review home BP readings        Diagnoses and associated orders:   1. Lumbar radiculopathy    2. Essential hypertension    3. Paroxysmal atrial fibrillation    4. Age-related osteoporosis without current pathological fracture        Objective:      Body mass index is 19.41 kg/m².  Vitals:    07/31/25 1024 07/31/25 1059   BP:  (!) (P) 187/80   Pulse: 61    SpO2: 99%    Weight: 43.6 kg (96 lb 1.9 oz)   "  Height: 4' 11" (1.499 m)    PainSc: 0-No pain      Physical Exam   Physical Exam    General: No acute distress. Normal appearance.  Head: Normocephalic.  Eyes: Extraocular movements intact.  Neck: No thyromegaly.  Cardiovascular: Normal rate and rhythm. No murmur heard.  Lungs: Pulmonary effort is normal. Normal breath sounds. No wheezing. No rales.  Abdomen: Flat.  Musculoskeletal: No edema lower extremities.  Skin: Warm. Dry.  Neurological: Alert.  Psychiatric: Normal mood. Normal behavior.       Current Outpatient Medications   Medication Instructions    amLODIPine (NORVASC) 2.5 mg, Oral, Daily    apixaban (ELIQUIS) 2.5 mg, Oral, 2 times daily    atorvastatin (LIPITOR) 20 mg, Oral, Daily    biotin 5,000 mcg Chew Biotin 5000    calcium carbonate (OS-TAMMI) 500 mg calcium (1,250 mg) chewable tablet 1 tablet, Daily    calcium carbonate (OS-TAMMI) 600 mg calcium (1,500 mg) Tab 1 tablet with meals Orally Twice a day for 30 day(s)    celecoxib (CELEBREX) 100 MG capsule 1 capsule with food Orally Once a day    celecoxib (CELEBREX) 200 mg, Oral, 2 times daily PRN    cholecalciferol (vitamin D3) (VITAMIN D3) 2,000 Units, Daily    cyanocobalamin (VITAMIN B-12) 1,000 mcg    EVENITY 210 mg, Every 30 days    fluticasone propionate (FLONASE) 50 mcg, Each Nostril, Daily    gabapentin (NEURONTIN) 100 mg, Oral, 3 times daily PRN    losartan-hydrochlorothiazide 50-12.5 mg (HYZAAR) 50-12.5 mg per tablet 1 tablet, Oral, Daily    melatonin (MELATIN) 5 mg 1 tablet in the evening Orally Once a day    metoprolol succinate (TOPROL-XL) 25 mg, Oral, 2 times daily    mupirocin (BACTROBAN) 2 % ointment 3 times daily    simvastatin (ZOCOR) 5 MG tablet 1 tablet in the evening Orally Once a day; Duration: 90 days     All of your core healthy metrics are met.    Follow up in about 6 months (around 1/31/2026). or sooner prn (as needed)          Surinder Quintero  [facility]  [address] [place] [address] " [id number]  Phone [phone]  Fax [phone]    Part of this note is dictated using the [employer] [place] [employer] word recognition program. It may contain word recognition mistakes or wrong word substitutions (commonly he/she and is/was substitutions) that were missed on review.    Part of this note was generated with the assistance of ambient listening technology. Verbal consent was obtained by the patient and accompanying visitor(s) for the recording of patient appointment to facilitate this note. I attest to having reviewed and edited the generated note for accuracy, though some syntax or spelling errors may persist. Please contact the author of this note for any clarification.

## 2025-07-31 NOTE — PROGRESS NOTES
"    Outpatient Rehab    Physical Therapy Progress Note      Patient Name: Nicole Romero  MRN: [id number]  YOB: 1940  Encounter Date: 7/31/2025    Therapy Diagnosis:   Encounter Diagnoses   Name Primary?    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     Decreased strength of trunk and back        Physician: Carin Locke MD    Physician Orders: Eval and Treat  Preferred name "Rosita"   Medical Diagnosis: Sciatica, unspecified laterality  Surgical Diagnosis: Not applicable for this Episode   Surgical Date: Not applicable for this Episode  Days Since Last Surgery: Not applicable for this Episode    Visit # / Visits Authorized:  9/8 (pending)  + eval Preferred name "Rosita"   Insurance Authorization Period: 6/19/2025 to 12/31/2025  Date of Evaluation: 6/19/2025   Plan of Care Expiration:  6/19/2025 to 9/5/2025       PT/PTA: PTA   Number of PTA visits since last PT visit:1   Time In: 0930   Time Out: 1030  Total Time (in minutes): 60   Total Billable Time (in minutes): 30    FOTO:  Intake Score:  %  Survey Score 2:  %  Survey Score 3:  %    Precautions:          Subjective   Patient mainly c/o GUERLINE LE soreness due to increase ambulation, approximately 7K yesterday..  Pain reported as 5/10. (Les  due to prolonged walking)        Objective            Isometric Testing on Med X equipment: Testing administered by PT    Test Initial Midpoint Final   Spine area:LUMBAR      Date 06/23/25 07/28/25    ROM 0 - 51 deg 0 - 54 deg    Max Peak Torque 78  83    Min Peak Torque 16  23    Flex/Ext Ratio 4.875 3.6    % below normative data -51 -43%    % gain from initial test Not available visit 1 23         5TSTS:              06/19/25:  22.95 BUE used first 2 reps, inc LLE sx    06/23/25: 26.18 hands on thighs throughout, dec controlled descent    07/2825: 17.36 hands on thights throughout, no inc discomfort         OUTCOMES SELECTION:   Intake Outcome Measure for FOTO " "LUMBAR Survey     Therapist reviewed FOTO scores for Snehlata D Nick on 6/19/2025.   FOTO documents entered into [employer] - see Media section.     Intake Score: 40% functional ability  Visit 6:  NP   07/28/25: 49%     Goal Score: 63% functional ability     MDC = 7 points                   Treatment:  Therapeutic Exercise  TE 1: Recumbent Bike 5 min for functional endurance  TE 2: LTR 10x 5" cue to stay in non pain provoking range  TE 3: PPT 5x5"----->    + heel taps 10x3"  TE 5: Bridges 10x w/ arms crossed  TE 6: Prone press ups x 10 for postural correction and mobility  TE 9: + cat/cow 10x5"         + bird/dog 10x5"-nv  TE 10: Peripheral muscle strengthening which included one set of 15-20 repetitions at a slow and controlled 10-13 second per rep pace focused on strengthening supporting musculature in order to improve body mechanics and functional mobility. Patient and therapist focused on proper form during treatment to ensure optimal strengthening of each targeted muscle group.  Machines utilized included:Torso rotation, Leg Ext, Leg Curl, Chest Press, and RowingTriceps, Biceps, Hip Abd, , and Leg Press      MedX testing: Patient  received neuromuscular education to engage spinal musculature correctly for motor control and engagement of musculature including the MedX exercise component and practice and standard testing. MedX dynamic exercise and baseline isometric test performed with instructions to guide the patient safely through the testing procedure. Patient instructed to perform isometric test correctly and safely while building to an optimal force with a pain-free effort. Patient also instructed that they should feel support/pressure from MedX restraints but no pain/discomfort, and encouraged to report any pain to therapist. Patient demonstrated appropriate understanding of information and tolerance of test.  Education regarding purpose of test, safety during test given, and reviewed possible more soreness and " strategies.              7/31/2025    11:33 AM   [facility]   Visit Number 10   VAS Pain Rating 5   Time 5   Extension in Lying 10   Flexion in Lying 10   Lumbar Weight 33 lbs   Repetitions 18   Rating of Perceived Exertion 4         Time Entry(in minutes):  Neuromuscular Re-Education Time Entry: 10  Therapeutic Exercise Time Entry: 20    Assessment & Plan   Assessment:  Patient presents to therapy with reports of GUERLINE LE soreness due to increase ambulation. She was instructed in therex for lumbopelvis stability and pain reduction without adverse effects, responding well to progressed PPT. Cat/cow completed with L wrist discomfort experienced. Lumbar MedX resistance of 33 lbs/ft with completion of 18 reps at 4/10 RPE, demonstrating improved pacing. Peripheral training not completed due to another appt.    Evaluation/Treatment Tolerance: Patient tolerated treatment well      The patient will continue to benefit from skilled outpatient physical therapy in order to address the deficits listed in the problem list on the initial evaluation, provide patient and family education, and maximize the patients level of independence in the home and community environments.     The patient's spiritual, cultural, and educational needs were considered, and the patient is agreeable to the plan of care and goals.           Plan: Cont PT per POC.  Attention to PNE messaging to improve pt self efficacy    Goals:   Active       LTG       Pt will demonstrate increased lumbar MedX ROM by at least 9 degrees from initial ROM value, resulting in improved ability to perform functional forward bending while standing and sitting. (Progressing)       Start:  06/19/25    Expected End:  09/05/25            Pt will demonstrate increased MedX average isometric strength value by 20% from initial test resulting in improved ability to perform bending, lifting, and carrying activities safely and confidently.  (Progressing)       Start:  06/19/25     Expected End:  09/05/25            Pt to demonstrate ability to independently control and reduce their pain through posture positioning and mechanical movements throughout a typical day. (Progressing)       Start:  06/19/25    Expected End:  09/05/25            Pt will demonstrate reduced pain and improved functional outcomes as reported on the FOTO by reaching an intake score of >/= 63% functional ability in order to demonstrate subjective improvement in patient's condition.  (Progressing)       Start:  06/19/25    Expected End:  09/05/25            Pt will demonstrate independence with the HEP at discharge. (Progressing)       Start:  06/19/25    Expected End:  09/05/25               Patient specific functional goals        Patient ascend/descend 2 flights of stairs /c inc LB discomfort /c or /s handrails in < 60 sec for improved access in home (Progressing)       Start:  06/19/25    Expected End:  09/05/25            Patient lift 10# KB floor to plinth x 10 /s inc LB or LLE discomfort for inc tolerance to household chores (Progressing)       Start:  06/19/25    Expected End:  09/05/25            Patient complete 5TSTS < 15 sec /c inc LB or LLE discomfort indicating improved BLE functional strength and dec fall risk (Progressing)       Start:  06/19/25    Expected End:  09/05/25            Patient report achieving > 5000 steps in a day /s leading to debilitating LB or LLE discomfort indicating improved tolerance to recreational tasks  (Progressing)       Start:  06/19/25    Expected End:  09/05/25            Patient transition from seated<>supine<>prone x 2 < 30 sec /s inc LB or LLE discomfort indicating improved functional and bed mobility  (Progressing)       Start:  06/19/25    Expected End:  09/05/25            Patient report return to regular gym classes (variety of exercise themes) /s inc LB or LLE discomfort indicating improved self efficacy and tolerance to recreational activities (Progressing)        Start:  06/19/25    Expected End:  09/05/25               STG       Pt will demonstrate increased lumbar MedX ROM by at least 6 degrees from the initial ROM value with improvements noted in functional ROM and ability to perform ADLs (Progressing)       Start:  06/19/25    Expected End:  09/05/25            Pt will demonstrate increased MedX average isometric strength value by 15% from initial test resulting in improved ability to perform bending, lifting, and carrying activities safely, confidently. (Met)       Start:  06/19/25    Expected End:  09/05/25    Resolved:  07/28/25         Pt will report a reduction in worst pain score by 1-2 points for improved tolerance for household chores. (Met)       Start:  06/19/25    Expected End:  09/05/25    Resolved:  07/28/25         Pt able to perform HEP correctly with minimal cueing or supervision from therapist to encourage independent management of symptoms (Met)       Start:  06/19/25    Expected End:  09/05/25    Resolved:  07/28/25                 Francia Horne, PTA

## 2025-08-04 ENCOUNTER — CLINICAL SUPPORT (OUTPATIENT)
Dept: REHABILITATION | Facility: OTHER | Age: 85
End: 2025-08-04
Payer: MEDICARE

## 2025-08-04 DIAGNOSIS — R29.898 DECREASED STRENGTH OF TRUNK AND BACK: ICD-10-CM

## 2025-08-04 DIAGNOSIS — Z74.09 IMPAIRED FUNCTIONAL MOBILITY, BALANCE, GAIT, AND ENDURANCE: Primary | ICD-10-CM

## 2025-08-04 DIAGNOSIS — G89.29 CENTRAL SENSITIZATION TO PAIN: ICD-10-CM

## 2025-08-04 PROCEDURE — 97110 THERAPEUTIC EXERCISES: CPT | Mod: CQ

## 2025-08-04 PROCEDURE — 97112 NEUROMUSCULAR REEDUCATION: CPT | Mod: CQ

## 2025-08-04 NOTE — PROGRESS NOTES
"      Outpatient Rehab    Physical Therapy Progress Note      Patient Name: Nicole Romero  MRN: [id number]  YOB: 1940  Encounter Date: 8/4/2025    Therapy Diagnosis:   Encounter Diagnoses   Name Primary?    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     Decreased strength of trunk and back        Physician: Carin Locke MD    Physician Orders: Eval and Treat  Preferred name "Rosita"   Medical Diagnosis: Sciatica, unspecified laterality  Surgical Diagnosis: Not applicable for this Episode   Surgical Date: Not applicable for this Episode  Days Since Last Surgery: Not applicable for this Episode    Visit # / Visits Authorized:  11/18 (pending)  + eval Preferred name "Rosita"   Insurance Authorization Period: 6/19/2025 to 12/31/2025  Date of Evaluation: 6/19/2025   Plan of Care Expiration:  6/19/2025 to 9/5/2025       PT/PTA: PTA   Number of PTA visits since last PT visit:2   Time In: 0900   Time Out: 1000  Total Time (in minutes): 60   Total Billable Time (in minutes): 30    FOTO:  Intake Score:  %  Survey Score 2:  %  Survey Score 3:  %    Precautions:          Subjective   Patient reports persistent improvements despite intermittent exacerbations of symptoms..  Pain reported as 4/10.        Objective            Isometric Testing on Med X equipment: Testing administered by PT    Test Initial Midpoint Final   Spine area:LUMBAR      Date 06/23/25 07/28/25    ROM 0 - 51 deg 0 - 54 deg    Max Peak Torque 78  83    Min Peak Torque 16  23    Flex/Ext Ratio 4.875 3.6    % below normative data -51 -43%    % gain from initial test Not available visit 1 23         5TSTS:              06/19/25:  22.95 BUE used first 2 reps, inc LLE sx    06/23/25: 26.18 hands on thighs throughout, dec controlled descent    07/2825: 17.36 hands on thights throughout, no inc discomfort         OUTCOMES SELECTION:   Intake Outcome Measure for FOTO LUMBAR Survey     Therapist reviewed " "FOTO scores for Snalexanderlachaim Romero on 6/19/2025.   FOTO documents entered into [employer] - see Media section.     Intake Score: 40% functional ability  Visit 6:  NP   07/28/25: 49%     Goal Score: 63% functional ability     MDC = 7 points                   Treatment:  Therapeutic Exercise  TE 1: Recumbent Bike 5 min for functional endurance  TE 2: LTR 10x 5" cue to stay in non pain provoking range  TE 10: Peripheral muscle strengthening which included one set of 15-20 repetitions at a slow and controlled 10-13 second per rep pace focused on strengthening supporting musculature in order to improve body mechanics and functional mobility. Patient and therapist focused on proper form during treatment to ensure optimal strengthening of each targeted muscle group.  Machines utilized included:Torso rotation, Leg Ext, Leg Curl, Chest Press, and RowingTriceps, Biceps, Hip Abd, , and Leg Press  Therapeutic Activity  TA 1: Lifting mechanics and education  Proper lifting techniques  -Neutral spine with hip hinge and knee with 10# KB   10x   - Sit to stand with 7# KB  10x     --Lifting demonstration with crate and load transfer from floor to mat with 10#  10X with lumbar muscular fatigue   -Golfer's lift with 10x      MedX testing: Patient  received neuromuscular education to engage spinal musculature correctly for motor control and engagement of musculature including the MedX exercise component and practice and standard testing. MedX dynamic exercise and baseline isometric test performed with instructions to guide the patient safely through the testing procedure. Patient instructed to perform isometric test correctly and safely while building to an optimal force with a pain-free effort. Patient also instructed that they should feel support/pressure from MedX restraints but no pain/discomfort, and encouraged to report any pain to therapist. Patient demonstrated appropriate understanding of information and tolerance of test.  Education " "regarding purpose of test, safety during test given, and reviewed possible more soreness and strategies.                     8/4/2025    10:20 AM   [facility]   Visit Number 11   VAS Pain Rating 4   Time 5   Lumbar Weight 33 lbs   Repetitions 20   Rating of Perceived Exertion 4   Ice - Z Lie (in min.) 0           Time Entry(in minutes):  Neuromuscular Re-Education Time Entry: 10  Therapeutic Exercise Time Entry: 20    Assessment & Plan   Assessment:  Patient presents to therapy with reports of overall improvements despite intermittent/ "comes and goes" pain depending on activity. She was instructed in lifting training for safety and injury prevention during load transfers, requiring multiple cues to maintain neutral spine. Lumbar MedX resistance of 33 lbs/ft with completion of 20 reps at 4/10 RPE, demonstrating improved pacing. Peripheral training completed without adverse effects.      Evaluation/Treatment Tolerance: Patient tolerated treatment well      The patient will continue to benefit from skilled outpatient physical therapy in order to address the deficits listed in the problem list on the initial evaluation, provide patient and family education, and maximize the patients level of independence in the home and community environments.     The patient's spiritual, cultural, and educational needs were considered, and the patient is agreeable to the plan of care and goals.           Plan: Cont PT per POC.  Attention to PNE messaging to improve pt self efficacy    Goals:   Active       LTG       Pt will demonstrate increased lumbar MedX ROM by at least 9 degrees from initial ROM value, resulting in improved ability to perform functional forward bending while standing and sitting. (Progressing)       Start:  06/19/25    Expected End:  09/05/25            Pt will demonstrate increased MedX average isometric strength value by 20% from initial test resulting in improved ability to perform bending, lifting, " and carrying activities safely and confidently.  (Progressing)       Start:  06/19/25    Expected End:  09/05/25            Pt to demonstrate ability to independently control and reduce their pain through posture positioning and mechanical movements throughout a typical day. (Progressing)       Start:  06/19/25    Expected End:  09/05/25            Pt will demonstrate reduced pain and improved functional outcomes as reported on the FOTO by reaching an intake score of >/= 63% functional ability in order to demonstrate subjective improvement in patient's condition.  (Progressing)       Start:  06/19/25    Expected End:  09/05/25            Pt will demonstrate independence with the HEP at discharge. (Progressing)       Start:  06/19/25    Expected End:  09/05/25               Patient specific functional goals        Patient ascend/descend 2 flights of stairs /c inc LB discomfort /c or /s handrails in < 60 sec for improved access in home (Progressing)       Start:  06/19/25    Expected End:  09/05/25            Patient lift 10# KB floor to plinth x 10 /s inc LB or LLE discomfort for inc tolerance to household chores (Progressing)       Start:  06/19/25    Expected End:  09/05/25            Patient complete 5TSTS < 15 sec /c inc LB or LLE discomfort indicating improved BLE functional strength and dec fall risk (Progressing)       Start:  06/19/25    Expected End:  09/05/25            Patient report achieving > 5000 steps in a day /s leading to debilitating LB or LLE discomfort indicating improved tolerance to recreational tasks  (Progressing)       Start:  06/19/25    Expected End:  09/05/25            Patient transition from seated<>supine<>prone x 2 < 30 sec /s inc LB or LLE discomfort indicating improved functional and bed mobility  (Progressing)       Start:  06/19/25    Expected End:  09/05/25            Patient report return to regular gym classes (variety of exercise themes) /s inc LB or LLE discomfort indicating  improved self efficacy and tolerance to recreational activities (Progressing)       Start:  06/19/25    Expected End:  09/05/25               STG       Pt will demonstrate increased lumbar MedX ROM by at least 6 degrees from the initial ROM value with improvements noted in functional ROM and ability to perform ADLs (Progressing)       Start:  06/19/25    Expected End:  09/05/25            Pt will demonstrate increased MedX average isometric strength value by 15% from initial test resulting in improved ability to perform bending, lifting, and carrying activities safely, confidently. (Met)       Start:  06/19/25    Expected End:  09/05/25    Resolved:  07/28/25         Pt will report a reduction in worst pain score by 1-2 points for improved tolerance for household chores. (Met)       Start:  06/19/25    Expected End:  09/05/25    Resolved:  07/28/25         Pt able to perform HEP correctly with minimal cueing or supervision from therapist to encourage independent management of symptoms (Met)       Start:  06/19/25    Expected End:  09/05/25    Resolved:  07/28/25                 Francia Horne PTA

## 2025-08-08 ENCOUNTER — CLINICAL SUPPORT (OUTPATIENT)
Dept: REHABILITATION | Facility: OTHER | Age: 85
End: 2025-08-08
Payer: MEDICARE

## 2025-08-08 DIAGNOSIS — R29.898 DECREASED STRENGTH OF TRUNK AND BACK: ICD-10-CM

## 2025-08-08 DIAGNOSIS — Z74.09 IMPAIRED FUNCTIONAL MOBILITY, BALANCE, GAIT, AND ENDURANCE: Primary | ICD-10-CM

## 2025-08-08 DIAGNOSIS — G89.29 CENTRAL SENSITIZATION TO PAIN: ICD-10-CM

## 2025-08-08 PROCEDURE — 97112 NEUROMUSCULAR REEDUCATION: CPT | Mod: CQ

## 2025-08-08 PROCEDURE — 97110 THERAPEUTIC EXERCISES: CPT | Mod: CQ

## 2025-08-08 NOTE — PROGRESS NOTES
"      Outpatient Rehab    Physical Therapy Progress Note      Patient Name: Nicole Romero  MRN: [id number]  YOB: 1940  Encounter Date: 8/8/2025    Therapy Diagnosis:   Encounter Diagnoses   Name Primary?    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     Decreased strength of trunk and back        Physician: Carin Locke MD    Physician Orders: Eval and Treat  Preferred name "Rosita"   Medical Diagnosis: Sciatica, unspecified laterality  Surgical Diagnosis: Not applicable for this Episode   Surgical Date: Not applicable for this Episode  Days Since Last Surgery: Not applicable for this Episode    Visit # / Visits Authorized:  11/18 (pending)  + eval Preferred name "Rosita"   Insurance Authorization Period: 6/19/2025 to 12/31/2025  Date of Evaluation: 6/19/2025   Plan of Care Expiration:  6/19/2025 to 9/5/2025       PT/PTA: PTA   Number of PTA visits since last PT visit:4   Time In: 0900   Time Out: 1000  Total Time (in minutes): 60   Total Billable Time (in minutes): 55    FOTO:  Intake Score:  %  Survey Score 2: 53%  Survey Score 3:  %    Precautions:          Subjective   Patient reports persistent improvements despite intermittent exacerbations of symptoms during prolonged standing..  Pain reported as 4/10.        Objective            Isometric Testing on Med X equipment: Testing administered by PT    Test Initial Midpoint Final   Spine area:LUMBAR      Date 06/23/25 07/28/25    ROM 0 - 51 deg 0 - 54 deg    Max Peak Torque 78  83    Min Peak Torque 16  23    Flex/Ext Ratio 4.875 3.6    % below normative data -51 -43%    % gain from initial test Not available visit 1 23         5TSTS:              06/19/25:  22.95 BUE used first 2 reps, inc LLE sx    06/23/25: 26.18 hands on thighs throughout, dec controlled descent    07/2825: 17.36 hands on thights throughout, no inc discomfort         OUTCOMES SELECTION:   Intake Outcome Measure for FOTO LUMBAR " "Survey     Therapist reviewed FOTO scores for Kathya Romero on 6/19/2025.   FOTO documents entered into [employer] - see Media section.     Intake Score: 40% functional ability  Visit 6:  NP   07/28/25: 49%     Goal Score: 63% functional ability     MDC = 7 points                   Treatment:  Therapeutic Exercise  TE 1: Recumbent Bike 5 min for functional endurance  TE 2: LTR 10x 5" cue to stay in non pain provoking range  TE 3: PPT 5x5"----->    --heel taps 10x3"  TE 5: Bridges 15x w/ arms crossed  TE 6: Prone press ups x 10 for postural correction and mobility  TE 7: Supine clamshells x15  Y Tband  TE 9: -- cat/cow 10x5"          +bird/dog 10x5"-nv    Peripheral muscle strengthening which included one set of 15-20 repetitions at a slow and controlled 10-13 second per rep pace focused on strengthening supporting musculature in order to improve body mechanics and functional mobility. Patient and therapist focused on proper form during treatment to ensure optimal strengthening of each targeted muscle group.  Machines utilized included:Torso rotation, Leg Ext, Leg Curl, Chest Press, and RowingTriceps, Biceps, Hip Abd, Hip Add, and Leg Press               Medical MedX Treatment as follows:  Patient received neuromuscular education for 10 minutes via participation on the Medical MedX Machine. Therapist assisted patient in isolating and engaging spinal stabilization musculature in order to improve functional ability and postural control. Patient performed exercise with therapist guidance in order to accurately use pacer function, avoid valsalva, and optimally exert effort within a safe and effective range via the Chad Exertion Rating Scale. Patient instructed to perform at a midrange of exertion and to complete 15-20 repetitions within appropriate split time, with proper technique, and while maintaining safety.             8/8/2025     9:50 AM   [facility]   Visit Number 12   VAS Pain Rating 4   Time 5   Extension " in Lying 10   Flexion in Lying 10   Lumbar Weight 36 lbs   Repetitions 15   Rating of Perceived Exertion 5   Ice - Z Lie (in min.) 5              Time Entry(in minutes):  Neuromuscular Re-Education Time Entry: 10  Therapeutic Exercise Time Entry: 45    Assessment & Plan   Assessment:   Patient presents to therapy with reports of overall improvements despite intermittent pain that varies with activity, particularly during prolonged standing. She was instructed in therex to promote improved core stability for lumbar support, requiring cues to prevent anterior tilt during heel taps. Difficulty noted while performing quadruped cat/cow, where  GUERLINE knee flexion substitution was noted. Despite experiencing discomfort, she was able to complete full reps of prone press-ups. Lumbar MedX resistance increased to 36 lbs/ft with completion of 15 reps. She was unable to provide an exertion rating of lumbar musculature due to compensation with LEs, rating LEs at 5/10 RPE. Plan to reduce MedX resistance due to difficulty noted. Peripheral training completed without adverse effects.     Evaluation/Treatment Tolerance: Patient tolerated treatment well      The patient will continue to benefit from skilled outpatient physical therapy in order to address the deficits listed in the problem list on the initial evaluation, provide patient and family education, and maximize the patients level of independence in the home and community environments.     The patient's spiritual, cultural, and educational needs were considered, and the patient is agreeable to the plan of care and goals.           Plan: Cont PT per POC.  Attention to PNE messaging to improve pt self efficacy    Goals:   Active       LTG       Pt will demonstrate increased lumbar MedX ROM by at least 9 degrees from initial ROM value, resulting in improved ability to perform functional forward bending while standing and sitting. (Progressing)       Start:  06/19/25    Expected  End:  09/05/25            Pt will demonstrate increased MedX average isometric strength value by 20% from initial test resulting in improved ability to perform bending, lifting, and carrying activities safely and confidently.  (Progressing)       Start:  06/19/25    Expected End:  09/05/25            Pt to demonstrate ability to independently control and reduce their pain through posture positioning and mechanical movements throughout a typical day. (Progressing)       Start:  06/19/25    Expected End:  09/05/25            Pt will demonstrate reduced pain and improved functional outcomes as reported on the FOTO by reaching an intake score of >/= 63% functional ability in order to demonstrate subjective improvement in patient's condition.  (Progressing)       Start:  06/19/25    Expected End:  09/05/25            Pt will demonstrate independence with the HEP at discharge. (Progressing)       Start:  06/19/25    Expected End:  09/05/25               Patient specific functional goals        Patient ascend/descend 2 flights of stairs /c inc LB discomfort /c or /s handrails in < 60 sec for improved access in home (Progressing)       Start:  06/19/25    Expected End:  09/05/25            Patient lift 10# KB floor to plinth x 10 /s inc LB or LLE discomfort for inc tolerance to household chores (Progressing)       Start:  06/19/25    Expected End:  09/05/25            Patient complete 5TSTS < 15 sec /c inc LB or LLE discomfort indicating improved BLE functional strength and dec fall risk (Progressing)       Start:  06/19/25    Expected End:  09/05/25            Patient report achieving > 5000 steps in a day /s leading to debilitating LB or LLE discomfort indicating improved tolerance to recreational tasks  (Progressing)       Start:  06/19/25    Expected End:  09/05/25            Patient transition from seated<>supine<>prone x 2 < 30 sec /s inc LB or LLE discomfort indicating improved functional and bed mobility   (Progressing)       Start:  06/19/25    Expected End:  09/05/25            Patient report return to regular gym classes (variety of exercise themes) /s inc LB or LLE discomfort indicating improved self efficacy and tolerance to recreational activities (Progressing)       Start:  06/19/25    Expected End:  09/05/25               STG       Pt will demonstrate increased lumbar MedX ROM by at least 6 degrees from the initial ROM value with improvements noted in functional ROM and ability to perform ADLs (Progressing)       Start:  06/19/25    Expected End:  09/05/25            Pt will demonstrate increased MedX average isometric strength value by 15% from initial test resulting in improved ability to perform bending, lifting, and carrying activities safely, confidently. (Met)       Start:  06/19/25    Expected End:  09/05/25    Resolved:  07/28/25         Pt will report a reduction in worst pain score by 1-2 points for improved tolerance for household chores. (Met)       Start:  06/19/25    Expected End:  09/05/25    Resolved:  07/28/25         Pt able to perform HEP correctly with minimal cueing or supervision from therapist to encourage independent management of symptoms (Met)       Start:  06/19/25    Expected End:  09/05/25    Resolved:  07/28/25                 Francia Horne PTA

## 2025-08-19 ENCOUNTER — CLINICAL SUPPORT (OUTPATIENT)
Dept: REHABILITATION | Facility: OTHER | Age: 85
End: 2025-08-19
Payer: MEDICARE

## 2025-08-19 ENCOUNTER — OFFICE VISIT (OUTPATIENT)
Dept: CARDIOLOGY | Facility: CLINIC | Age: 85
End: 2025-08-19
Attending: INTERNAL MEDICINE
Payer: MEDICARE

## 2025-08-19 VITALS
BODY MASS INDEX: 19.44 KG/M2 | DIASTOLIC BLOOD PRESSURE: 74 MMHG | WEIGHT: 96.44 LBS | HEART RATE: 60 BPM | SYSTOLIC BLOOD PRESSURE: 173 MMHG | OXYGEN SATURATION: 100 % | HEIGHT: 59 IN

## 2025-08-19 DIAGNOSIS — I72.9 RUPTURED ANEURYSM OF ARTERY: ICD-10-CM

## 2025-08-19 DIAGNOSIS — Z79.01 CHRONIC ANTICOAGULATION: ICD-10-CM

## 2025-08-19 DIAGNOSIS — Z74.09 IMPAIRED FUNCTIONAL MOBILITY, BALANCE, GAIT, AND ENDURANCE: Primary | ICD-10-CM

## 2025-08-19 DIAGNOSIS — I10 PRIMARY HYPERTENSION: ICD-10-CM

## 2025-08-19 DIAGNOSIS — I10 ESSENTIAL HYPERTENSION: ICD-10-CM

## 2025-08-19 DIAGNOSIS — R29.898 DECREASED STRENGTH OF TRUNK AND BACK: ICD-10-CM

## 2025-08-19 DIAGNOSIS — I48.0 PAROXYSMAL ATRIAL FIBRILLATION: ICD-10-CM

## 2025-08-19 DIAGNOSIS — I49.1 ATRIAL PREMATURE BEATS: ICD-10-CM

## 2025-08-19 DIAGNOSIS — I65.29 STENOSIS OF CAROTID ARTERY, UNSPECIFIED LATERALITY: ICD-10-CM

## 2025-08-19 DIAGNOSIS — G89.29 CENTRAL SENSITIZATION TO PAIN: ICD-10-CM

## 2025-08-19 DIAGNOSIS — E78.5 HYPERLIPIDEMIA, UNSPECIFIED HYPERLIPIDEMIA TYPE: ICD-10-CM

## 2025-08-19 DIAGNOSIS — E78.00 HYPERCHOLESTEROLEMIA: ICD-10-CM

## 2025-08-19 LAB
OHS QRS DURATION: 72 MS
OHS QTC CALCULATION: 363 MS

## 2025-08-19 PROCEDURE — 93005 ELECTROCARDIOGRAM TRACING: CPT | Mod: PBBFAC | Performed by: INTERNAL MEDICINE

## 2025-08-19 PROCEDURE — 97110 THERAPEUTIC EXERCISES: CPT | Mod: CQ

## 2025-08-19 PROCEDURE — 97112 NEUROMUSCULAR REEDUCATION: CPT | Mod: CQ

## 2025-08-19 PROCEDURE — 99213 OFFICE O/P EST LOW 20 MIN: CPT | Mod: PBBFAC | Performed by: INTERNAL MEDICINE

## 2025-08-19 PROCEDURE — 99999 PR PBB SHADOW E&M-EST. PATIENT-LVL III: CPT | Mod: PBBFAC,,, | Performed by: INTERNAL MEDICINE

## 2025-08-19 RX ORDER — AMLODIPINE BESYLATE 5 MG/1
5 TABLET ORAL DAILY
Qty: 90 TABLET | Refills: 3 | Status: SHIPPED | OUTPATIENT
Start: 2025-08-19

## 2025-08-19 RX ORDER — ATORVASTATIN CALCIUM 20 MG/1
20 TABLET, FILM COATED ORAL DAILY
Qty: 90 TABLET | Refills: 3 | Status: SHIPPED | OUTPATIENT
Start: 2025-08-19

## 2025-08-19 RX ORDER — METOPROLOL SUCCINATE 25 MG/1
25 TABLET, EXTENDED RELEASE ORAL 2 TIMES DAILY
Qty: 180 TABLET | Refills: 3 | Status: SHIPPED | OUTPATIENT
Start: 2025-08-19

## 2025-08-19 RX ORDER — LOSARTAN POTASSIUM 100 MG/1
100 TABLET ORAL DAILY
Qty: 90 TABLET | Refills: 3 | Status: SHIPPED | OUTPATIENT
Start: 2025-08-19

## 2025-08-19 RX ORDER — HYDROCHLOROTHIAZIDE 12.5 MG/1
12.5 TABLET ORAL DAILY
Qty: 90 TABLET | Refills: 3 | Status: SHIPPED | OUTPATIENT
Start: 2025-08-19

## 2025-08-22 ENCOUNTER — INFUSION (OUTPATIENT)
Dept: INFECTIOUS DISEASES | Facility: HOSPITAL | Age: 85
End: 2025-08-22
Attending: INTERNAL MEDICINE
Payer: MEDICARE

## 2025-08-22 ENCOUNTER — CLINICAL SUPPORT (OUTPATIENT)
Dept: REHABILITATION | Facility: OTHER | Age: 85
End: 2025-08-22
Payer: MEDICARE

## 2025-08-22 VITALS
TEMPERATURE: 98 F | WEIGHT: 96.13 LBS | SYSTOLIC BLOOD PRESSURE: 146 MMHG | RESPIRATION RATE: 16 BRPM | DIASTOLIC BLOOD PRESSURE: 67 MMHG | BODY MASS INDEX: 19.38 KG/M2 | HEART RATE: 58 BPM | HEIGHT: 59 IN | OXYGEN SATURATION: 100 %

## 2025-08-22 DIAGNOSIS — M81.0 AGE-RELATED OSTEOPOROSIS WITHOUT CURRENT PATHOLOGICAL FRACTURE: Primary | ICD-10-CM

## 2025-08-22 DIAGNOSIS — Z74.09 IMPAIRED FUNCTIONAL MOBILITY, BALANCE, GAIT, AND ENDURANCE: Primary | ICD-10-CM

## 2025-08-22 DIAGNOSIS — R29.898 DECREASED STRENGTH OF TRUNK AND BACK: ICD-10-CM

## 2025-08-22 DIAGNOSIS — G89.29 CENTRAL SENSITIZATION TO PAIN: ICD-10-CM

## 2025-08-22 PROCEDURE — 97112 NEUROMUSCULAR REEDUCATION: CPT | Mod: CQ

## 2025-08-22 PROCEDURE — 97110 THERAPEUTIC EXERCISES: CPT | Mod: CQ

## 2025-08-22 PROCEDURE — 96372 THER/PROPH/DIAG INJ SC/IM: CPT

## 2025-08-22 PROCEDURE — 63600175 PHARM REV CODE 636 W HCPCS: Mod: JZ,TB | Performed by: INTERNAL MEDICINE

## 2025-08-22 RX ADMIN — ROMOSOZUMAB-AQQG 210 MG: 105 INJECTION, SOLUTION SUBCUTANEOUS at 01:08
